# Patient Record
Sex: MALE | Race: OTHER | NOT HISPANIC OR LATINO | URBAN - METROPOLITAN AREA
[De-identification: names, ages, dates, MRNs, and addresses within clinical notes are randomized per-mention and may not be internally consistent; named-entity substitution may affect disease eponyms.]

---

## 2023-10-30 ENCOUNTER — HOSPITAL ENCOUNTER (OUTPATIENT)
Facility: HOSPITAL | Age: 38
Discharge: HOME | End: 2023-11-01
Attending: EMERGENCY MEDICINE | Admitting: EMERGENCY MEDICINE
Payer: MEDICAID

## 2023-10-30 DIAGNOSIS — R56.9 SEIZURE-LIKE ACTIVITY: ICD-10-CM

## 2023-10-30 DIAGNOSIS — R56.9 SEIZURE: Primary | ICD-10-CM

## 2023-10-30 DIAGNOSIS — R07.9 CHEST PAIN: ICD-10-CM

## 2023-10-30 PROBLEM — F11.10 OPIOID ABUSE: Status: ACTIVE | Noted: 2023-10-30

## 2023-10-30 PROBLEM — F11.20 SEVERE OPIOID USE DISORDER: Status: ACTIVE | Noted: 2023-10-30

## 2023-10-30 LAB
ALBUMIN SERPL BCP-MCNC: 4.6 G/DL (ref 3.5–5.2)
ALP SERPL-CCNC: 39 U/L (ref 55–135)
ALT SERPL W/O P-5'-P-CCNC: 27 U/L (ref 10–44)
AMPHET+METHAMPHET UR QL: NEGATIVE
ANION GAP SERPL CALC-SCNC: 8 MMOL/L (ref 8–16)
APAP SERPL-MCNC: 8.3 UG/ML (ref 10–20)
AST SERPL-CCNC: 29 U/L (ref 10–40)
BACTERIA #/AREA URNS AUTO: ABNORMAL /HPF
BARBITURATES UR QL SCN>200 NG/ML: NEGATIVE
BASOPHILS # BLD AUTO: 0.03 K/UL (ref 0–0.2)
BASOPHILS NFR BLD: 0.3 % (ref 0–1.9)
BENZODIAZ UR QL SCN>200 NG/ML: ABNORMAL
BILIRUB SERPL-MCNC: 0.6 MG/DL (ref 0.1–1)
BILIRUB UR QL STRIP: NEGATIVE
BUN SERPL-MCNC: 11 MG/DL (ref 6–20)
BZE UR QL SCN: ABNORMAL
CALCIUM SERPL-MCNC: 9.8 MG/DL (ref 8.7–10.5)
CANNABINOIDS UR QL SCN: ABNORMAL
CHLORIDE SERPL-SCNC: 107 MMOL/L (ref 95–110)
CHOLEST SERPL-MCNC: 205 MG/DL (ref 120–199)
CHOLEST/HDLC SERPL: 4.7 {RATIO} (ref 2–5)
CK SERPL-CCNC: 822 U/L (ref 20–200)
CLARITY UR REFRACT.AUTO: CLEAR
CO2 SERPL-SCNC: 25 MMOL/L (ref 23–29)
COLOR UR AUTO: YELLOW
CREAT SERPL-MCNC: 1.1 MG/DL (ref 0.5–1.4)
CREAT UR-MCNC: >450 MG/DL (ref 23–375)
CREAT UR-MCNC: >450 MG/DL (ref 23–375)
DIFFERENTIAL METHOD: ABNORMAL
EOSINOPHIL # BLD AUTO: 0 K/UL (ref 0–0.5)
EOSINOPHIL NFR BLD: 0 % (ref 0–8)
ERYTHROCYTE [DISTWIDTH] IN BLOOD BY AUTOMATED COUNT: 12.8 % (ref 11.5–14.5)
EST. GFR  (NO RACE VARIABLE): >60 ML/MIN/1.73 M^2
ETHANOL SERPL-MCNC: <10 MG/DL
FENTANYL UR QL SCN: ABNORMAL
GLUCOSE SERPL-MCNC: 115 MG/DL (ref 70–110)
GLUCOSE UR QL STRIP: NEGATIVE
HCT VFR BLD AUTO: 41.8 % (ref 40–54)
HCV AB SERPL QL IA: NORMAL
HDLC SERPL-MCNC: 44 MG/DL (ref 40–75)
HDLC SERPL: 21.5 % (ref 20–50)
HGB BLD-MCNC: 15.4 G/DL (ref 14–18)
HGB UR QL STRIP: NEGATIVE
HIV 1+2 AB+HIV1 P24 AG SERPL QL IA: NORMAL
HYALINE CASTS UR QL AUTO: 0 /LPF
IMM GRANULOCYTES # BLD AUTO: 0.02 K/UL (ref 0–0.04)
IMM GRANULOCYTES NFR BLD AUTO: 0.2 % (ref 0–0.5)
INFLUENZA A, MOLECULAR: NEGATIVE
INFLUENZA B, MOLECULAR: NEGATIVE
KETONES UR QL STRIP: ABNORMAL
LDLC SERPL CALC-MCNC: 148.6 MG/DL (ref 63–159)
LEUKOCYTE ESTERASE UR QL STRIP: NEGATIVE
LYMPHOCYTES # BLD AUTO: 1.3 K/UL (ref 1–4.8)
LYMPHOCYTES NFR BLD: 11.7 % (ref 18–48)
MCH RBC QN AUTO: 28.2 PG (ref 27–31)
MCHC RBC AUTO-ENTMCNC: 36.8 G/DL (ref 32–36)
MCV RBC AUTO: 76 FL (ref 82–98)
METHADONE UR QL SCN>300 NG/ML: NEGATIVE
MICROSCOPIC COMMENT: ABNORMAL
MONOCYTES # BLD AUTO: 0.6 K/UL (ref 0.3–1)
MONOCYTES NFR BLD: 5.9 % (ref 4–15)
NEUTROPHILS # BLD AUTO: 8.8 K/UL (ref 1.8–7.7)
NEUTROPHILS NFR BLD: 81.9 % (ref 38–73)
NITRITE UR QL STRIP: NEGATIVE
NONHDLC SERPL-MCNC: 161 MG/DL
NRBC BLD-RTO: 0 /100 WBC
OPIATES UR QL SCN: NEGATIVE
PCP UR QL SCN>25 NG/ML: NEGATIVE
PH UR STRIP: 5 [PH] (ref 5–8)
PLATELET # BLD AUTO: 299 K/UL (ref 150–450)
PMV BLD AUTO: 11.7 FL (ref 9.2–12.9)
POTASSIUM SERPL-SCNC: 3.9 MMOL/L (ref 3.5–5.1)
PROT SERPL-MCNC: 8.3 G/DL (ref 6–8.4)
PROT UR QL STRIP: ABNORMAL
RBC # BLD AUTO: 5.47 M/UL (ref 4.6–6.2)
RBC #/AREA URNS AUTO: 10 /HPF (ref 0–4)
SARS-COV-2 RDRP RESP QL NAA+PROBE: NEGATIVE
SODIUM SERPL-SCNC: 140 MMOL/L (ref 136–145)
SP GR UR STRIP: >=1.03 (ref 1–1.03)
SPECIMEN SOURCE: NORMAL
SQUAMOUS #/AREA URNS AUTO: 0 /HPF
T4 FREE SERPL-MCNC: 0.9 NG/DL (ref 0.71–1.51)
TOXICOLOGY INFORMATION: ABNORMAL
TRIGL SERPL-MCNC: 62 MG/DL (ref 30–150)
TSH SERPL DL<=0.005 MIU/L-ACNC: 0.15 UIU/ML (ref 0.4–4)
URN SPEC COLLECT METH UR: ABNORMAL
WBC # BLD AUTO: 10.79 K/UL (ref 3.9–12.7)
WBC #/AREA URNS AUTO: 1 /HPF (ref 0–5)

## 2023-10-30 PROCEDURE — G0378 HOSPITAL OBSERVATION PER HR: HCPCS

## 2023-10-30 PROCEDURE — 25000003 PHARM REV CODE 250: Performed by: EMERGENCY MEDICINE

## 2023-10-30 PROCEDURE — 96375 TX/PRO/DX INJ NEW DRUG ADDON: CPT

## 2023-10-30 PROCEDURE — 93010 ELECTROCARDIOGRAM REPORT: CPT | Mod: ,,, | Performed by: INTERNAL MEDICINE

## 2023-10-30 PROCEDURE — 84443 ASSAY THYROID STIM HORMONE: CPT | Performed by: EMERGENCY MEDICINE

## 2023-10-30 PROCEDURE — 99285 EMERGENCY DEPT VISIT HI MDM: CPT | Mod: 25

## 2023-10-30 PROCEDURE — 86803 HEPATITIS C AB TEST: CPT | Performed by: PHYSICIAN ASSISTANT

## 2023-10-30 PROCEDURE — 85025 COMPLETE CBC W/AUTO DIFF WBC: CPT | Performed by: EMERGENCY MEDICINE

## 2023-10-30 PROCEDURE — 96374 THER/PROPH/DIAG INJ IV PUSH: CPT

## 2023-10-30 PROCEDURE — U0002 COVID-19 LAB TEST NON-CDC: HCPCS | Performed by: EMERGENCY MEDICINE

## 2023-10-30 PROCEDURE — 83036 HEMOGLOBIN GLYCOSYLATED A1C: CPT | Performed by: PHYSICIAN ASSISTANT

## 2023-10-30 PROCEDURE — 63600175 PHARM REV CODE 636 W HCPCS: Performed by: EMERGENCY MEDICINE

## 2023-10-30 PROCEDURE — 80061 LIPID PANEL: CPT | Performed by: PHYSICIAN ASSISTANT

## 2023-10-30 PROCEDURE — 93005 ELECTROCARDIOGRAM TRACING: CPT

## 2023-10-30 PROCEDURE — 80143 DRUG ASSAY ACETAMINOPHEN: CPT | Performed by: EMERGENCY MEDICINE

## 2023-10-30 PROCEDURE — 25000003 PHARM REV CODE 250: Performed by: NURSE PRACTITIONER

## 2023-10-30 PROCEDURE — 80354 DRUG SCREENING FENTANYL: CPT | Performed by: NURSE PRACTITIONER

## 2023-10-30 PROCEDURE — 80307 DRUG TEST PRSMV CHEM ANLYZR: CPT | Performed by: NURSE PRACTITIONER

## 2023-10-30 PROCEDURE — 93010 EKG 12-LEAD: ICD-10-PCS | Mod: ,,, | Performed by: INTERNAL MEDICINE

## 2023-10-30 PROCEDURE — 87389 HIV-1 AG W/HIV-1&-2 AB AG IA: CPT | Performed by: PHYSICIAN ASSISTANT

## 2023-10-30 PROCEDURE — 82077 ASSAY SPEC XCP UR&BREATH IA: CPT | Performed by: EMERGENCY MEDICINE

## 2023-10-30 PROCEDURE — 82550 ASSAY OF CK (CPK): CPT | Performed by: EMERGENCY MEDICINE

## 2023-10-30 PROCEDURE — 87502 INFLUENZA DNA AMP PROBE: CPT | Performed by: EMERGENCY MEDICINE

## 2023-10-30 PROCEDURE — 96361 HYDRATE IV INFUSION ADD-ON: CPT

## 2023-10-30 PROCEDURE — 81001 URINALYSIS AUTO W/SCOPE: CPT | Mod: XB | Performed by: NURSE PRACTITIONER

## 2023-10-30 PROCEDURE — 84439 ASSAY OF FREE THYROXINE: CPT | Performed by: EMERGENCY MEDICINE

## 2023-10-30 PROCEDURE — 80053 COMPREHEN METABOLIC PANEL: CPT | Performed by: EMERGENCY MEDICINE

## 2023-10-30 PROCEDURE — 63600175 PHARM REV CODE 636 W HCPCS: Performed by: NURSE PRACTITIONER

## 2023-10-30 RX ORDER — ACETAMINOPHEN 325 MG/1
650 TABLET ORAL EVERY 4 HOURS PRN
Status: DISCONTINUED | OUTPATIENT
Start: 2023-10-30 | End: 2023-11-01 | Stop reason: HOSPADM

## 2023-10-30 RX ORDER — NALOXONE HCL 0.4 MG/ML
0.02 VIAL (ML) INJECTION
Status: DISCONTINUED | OUTPATIENT
Start: 2023-10-30 | End: 2023-11-01 | Stop reason: HOSPADM

## 2023-10-30 RX ORDER — CLONIDINE HYDROCHLORIDE 0.1 MG/1
0.1 TABLET ORAL EVERY 8 HOURS PRN
Status: DISCONTINUED | OUTPATIENT
Start: 2023-10-30 | End: 2023-11-01 | Stop reason: HOSPADM

## 2023-10-30 RX ORDER — IBUPROFEN 200 MG
16 TABLET ORAL
Status: DISCONTINUED | OUTPATIENT
Start: 2023-10-30 | End: 2023-11-01 | Stop reason: HOSPADM

## 2023-10-30 RX ORDER — TIZANIDINE 4 MG/1
4 TABLET ORAL EVERY 8 HOURS PRN
Status: DISCONTINUED | OUTPATIENT
Start: 2023-10-30 | End: 2023-11-01 | Stop reason: HOSPADM

## 2023-10-30 RX ORDER — LORAZEPAM 2 MG/ML
2 INJECTION INTRAMUSCULAR
Status: DISCONTINUED | OUTPATIENT
Start: 2023-10-30 | End: 2023-10-30

## 2023-10-30 RX ORDER — TRAZODONE HYDROCHLORIDE 100 MG/1
100 TABLET ORAL NIGHTLY PRN
Status: DISCONTINUED | OUTPATIENT
Start: 2023-10-30 | End: 2023-11-01 | Stop reason: HOSPADM

## 2023-10-30 RX ORDER — METHOCARBAMOL 500 MG/1
500 TABLET, FILM COATED ORAL 4 TIMES DAILY PRN
Status: DISCONTINUED | OUTPATIENT
Start: 2023-10-30 | End: 2023-10-30

## 2023-10-30 RX ORDER — SODIUM CHLORIDE 0.9 % (FLUSH) 0.9 %
10 SYRINGE (ML) INJECTION EVERY 12 HOURS PRN
Status: DISCONTINUED | OUTPATIENT
Start: 2023-10-30 | End: 2023-11-01 | Stop reason: HOSPADM

## 2023-10-30 RX ORDER — ONDANSETRON 2 MG/ML
4 INJECTION INTRAMUSCULAR; INTRAVENOUS EVERY 8 HOURS PRN
Status: DISCONTINUED | OUTPATIENT
Start: 2023-10-30 | End: 2023-11-01 | Stop reason: HOSPADM

## 2023-10-30 RX ORDER — DICYCLOMINE HYDROCHLORIDE 10 MG/1
10 CAPSULE ORAL 4 TIMES DAILY PRN
Status: DISCONTINUED | OUTPATIENT
Start: 2023-10-30 | End: 2023-11-01 | Stop reason: HOSPADM

## 2023-10-30 RX ORDER — SODIUM CHLORIDE 0.9 % (FLUSH) 0.9 %
10 SYRINGE (ML) INJECTION
Status: DISCONTINUED | OUTPATIENT
Start: 2023-10-30 | End: 2023-11-01 | Stop reason: HOSPADM

## 2023-10-30 RX ORDER — CLONIDINE HYDROCHLORIDE 0.1 MG/1
0.1 TABLET ORAL EVERY 8 HOURS PRN
Status: DISCONTINUED | OUTPATIENT
Start: 2023-10-30 | End: 2023-10-30

## 2023-10-30 RX ORDER — ONDANSETRON 2 MG/ML
4 INJECTION INTRAMUSCULAR; INTRAVENOUS
Status: COMPLETED | OUTPATIENT
Start: 2023-10-30 | End: 2023-10-30

## 2023-10-30 RX ORDER — SODIUM CHLORIDE 9 MG/ML
INJECTION, SOLUTION INTRAVENOUS CONTINUOUS
Status: DISCONTINUED | OUTPATIENT
Start: 2023-10-30 | End: 2023-11-01 | Stop reason: HOSPADM

## 2023-10-30 RX ORDER — TALC
6 POWDER (GRAM) TOPICAL NIGHTLY PRN
Status: DISCONTINUED | OUTPATIENT
Start: 2023-10-30 | End: 2023-11-01 | Stop reason: HOSPADM

## 2023-10-30 RX ORDER — LOPERAMIDE HYDROCHLORIDE 2 MG/1
2 CAPSULE ORAL 4 TIMES DAILY PRN
Status: DISCONTINUED | OUTPATIENT
Start: 2023-10-30 | End: 2023-11-01 | Stop reason: HOSPADM

## 2023-10-30 RX ORDER — LEVETIRACETAM 500 MG/5ML
2000 INJECTION, SOLUTION, CONCENTRATE INTRAVENOUS
Status: COMPLETED | OUTPATIENT
Start: 2023-10-30 | End: 2023-10-30

## 2023-10-30 RX ORDER — ACETAMINOPHEN 500 MG
1000 TABLET ORAL
Status: COMPLETED | OUTPATIENT
Start: 2023-10-30 | End: 2023-10-30

## 2023-10-30 RX ORDER — IBUPROFEN 200 MG
24 TABLET ORAL
Status: DISCONTINUED | OUTPATIENT
Start: 2023-10-30 | End: 2023-11-01 | Stop reason: HOSPADM

## 2023-10-30 RX ORDER — ACETAMINOPHEN 500 MG
1000 TABLET ORAL EVERY 8 HOURS PRN
Status: DISCONTINUED | OUTPATIENT
Start: 2023-10-30 | End: 2023-11-01 | Stop reason: HOSPADM

## 2023-10-30 RX ORDER — KETOROLAC TROMETHAMINE 30 MG/ML
10 INJECTION, SOLUTION INTRAMUSCULAR; INTRAVENOUS
Status: COMPLETED | OUTPATIENT
Start: 2023-10-30 | End: 2023-10-30

## 2023-10-30 RX ORDER — BUPRENORPHINE AND NALOXONE 8; 2 MG/1; MG/1
1 FILM, SOLUBLE BUCCAL; SUBLINGUAL ONCE
Status: COMPLETED | OUTPATIENT
Start: 2023-10-30 | End: 2023-10-30

## 2023-10-30 RX ORDER — GLUCAGON 1 MG
1 KIT INJECTION
Status: DISCONTINUED | OUTPATIENT
Start: 2023-10-30 | End: 2023-11-01 | Stop reason: HOSPADM

## 2023-10-30 RX ORDER — PROCHLORPERAZINE EDISYLATE 5 MG/ML
5 INJECTION INTRAMUSCULAR; INTRAVENOUS ONCE
Status: COMPLETED | OUTPATIENT
Start: 2023-10-30 | End: 2023-10-30

## 2023-10-30 RX ORDER — PROMETHAZINE HYDROCHLORIDE 25 MG/1
25 TABLET ORAL EVERY 6 HOURS PRN
Status: DISCONTINUED | OUTPATIENT
Start: 2023-10-30 | End: 2023-11-01 | Stop reason: HOSPADM

## 2023-10-30 RX ORDER — HYDROXYZINE HYDROCHLORIDE 25 MG/1
50 TABLET, FILM COATED ORAL EVERY 6 HOURS PRN
Status: DISCONTINUED | OUTPATIENT
Start: 2023-10-30 | End: 2023-11-01 | Stop reason: HOSPADM

## 2023-10-30 RX ADMIN — ONDANSETRON 4 MG: 2 INJECTION INTRAMUSCULAR; INTRAVENOUS at 04:10

## 2023-10-30 RX ADMIN — BUPRENORPHINE AND NALOXONE 1 FILM: 8; 2 FILM BUCCAL; SUBLINGUAL at 11:10

## 2023-10-30 RX ADMIN — KETOROLAC TROMETHAMINE 10 MG: 30 INJECTION INTRAMUSCULAR; INTRAVENOUS at 07:10

## 2023-10-30 RX ADMIN — LEVETIRACETAM 2000 MG: 100 INJECTION INTRAVENOUS at 04:10

## 2023-10-30 RX ADMIN — ACETAMINOPHEN 1000 MG: 500 TABLET ORAL at 04:10

## 2023-10-30 RX ADMIN — ACETAMINOPHEN 1000 MG: 500 TABLET ORAL at 11:10

## 2023-10-30 RX ADMIN — SODIUM CHLORIDE 1000 ML: 9 INJECTION, SOLUTION INTRAVENOUS at 10:10

## 2023-10-30 RX ADMIN — PROCHLORPERAZINE EDISYLATE 5 MG: 5 INJECTION INTRAMUSCULAR; INTRAVENOUS at 10:10

## 2023-10-30 RX ADMIN — LORAZEPAM 2 MG: 2 INJECTION INTRAMUSCULAR; INTRAVENOUS at 05:10

## 2023-10-30 NOTE — ED TRIAGE NOTES
The patient was voluntarily admitted to [facility] for a heroin detox. The patient according to the [facility] had three witnessed seizures this morning and per EMS the patient had tow witnessed seizures in route. The patient is also endorsing nausea and vomiting, body aches. The patient deneis SI or HI.

## 2023-10-30 NOTE — FIRST PROVIDER EVALUATION
Medical screening examination initiated.  I have conducted a focused provider triage encounter, findings are as follows:    Brief history of present illness:  37 yo M referred from [facility]. He presented there voluntarily for opioid detox and reported n/v. Unclear where triage CC of seizure came from    Vitals:    10/30/23 1514   BP: (!) 160/112   BP Location: Right arm   Patient Position: Sitting   Pulse: 60   Resp: 14   Temp: 99.7 °F (37.6 °C)   TempSrc: Oral   SpO2: 100%       Pertinent physical exam:  Sleepy but easily roused. No respiratory depression    Brief workup plan:  presentation not c/w opioid withdrawal. Exam c/w opioid intoxication or possible post-ictal state. Will obtain medical clearance labs for JOJO care disposition. Defer imaging to full evaluation in ED bed.    Preliminary workup initiated; this workup will be continued and followed by the physician or advanced practice provider that is assigned to the patient when roomed.

## 2023-10-30 NOTE — PHARMACY MED REC
"Admission Medication History     The home medication history was taken by [facility].    You may go to "Admission" then "Reconcile Home Medications" tabs to review and/or act upon these items.     The home medication list has been updated by the Pharmacy department.   Please read ALL comments highlighted in yellow.   Please address this information as you see fit.    Feel free to contact us if you have any questions or require assistance.      PATIENT REPORTS NOT TAKING ANY MEDICATIONS. NO FILL HISTORY.          Potential issues to be addressed PRIOR TO DISCHARGE  Please discuss with the patient barriers to adherence with medication treatment plans  Patient requires education regarding drug therapies     E[facility] V[facility]  EXT [id number]                  .          "

## 2023-10-30 NOTE — ED NOTES
Patient had one witnessed seizure, seizures lasted aproximitly one minute, MD made aware, MD at bedside. Patient is postictal and confused.

## 2023-10-31 LAB
ALBUMIN SERPL BCP-MCNC: 4.2 G/DL (ref 3.5–5.2)
ALP SERPL-CCNC: 34 U/L (ref 55–135)
ALT SERPL W/O P-5'-P-CCNC: 24 U/L (ref 10–44)
ANION GAP SERPL CALC-SCNC: 16 MMOL/L (ref 8–16)
AST SERPL-CCNC: 32 U/L (ref 10–40)
BASOPHILS # BLD AUTO: 0.04 K/UL (ref 0–0.2)
BASOPHILS NFR BLD: 0.3 % (ref 0–1.9)
BILIRUB SERPL-MCNC: 0.7 MG/DL (ref 0.1–1)
BUN SERPL-MCNC: 11 MG/DL (ref 6–20)
CALCIUM SERPL-MCNC: 9.1 MG/DL (ref 8.7–10.5)
CHLORIDE SERPL-SCNC: 108 MMOL/L (ref 95–110)
CO2 SERPL-SCNC: 17 MMOL/L (ref 23–29)
CREAT SERPL-MCNC: 1.1 MG/DL (ref 0.5–1.4)
DIFFERENTIAL METHOD: ABNORMAL
EOSINOPHIL # BLD AUTO: 0 K/UL (ref 0–0.5)
EOSINOPHIL NFR BLD: 0.1 % (ref 0–8)
ERYTHROCYTE [DISTWIDTH] IN BLOOD BY AUTOMATED COUNT: 12.8 % (ref 11.5–14.5)
EST. GFR  (NO RACE VARIABLE): >60 ML/MIN/1.73 M^2
ESTIMATED AVG GLUCOSE: 100 MG/DL (ref 68–131)
GLUCOSE SERPL-MCNC: 80 MG/DL (ref 70–110)
HBA1C MFR BLD: 5.1 % (ref 4–5.6)
HCT VFR BLD AUTO: 40.6 % (ref 40–54)
HGB BLD-MCNC: 15.2 G/DL (ref 14–18)
IMM GRANULOCYTES # BLD AUTO: 0.04 K/UL (ref 0–0.04)
IMM GRANULOCYTES NFR BLD AUTO: 0.3 % (ref 0–0.5)
LYMPHOCYTES # BLD AUTO: 2.9 K/UL (ref 1–4.8)
LYMPHOCYTES NFR BLD: 20.1 % (ref 18–48)
MAGNESIUM SERPL-MCNC: 2.1 MG/DL (ref 1.6–2.6)
MCH RBC QN AUTO: 28.1 PG (ref 27–31)
MCHC RBC AUTO-ENTMCNC: 37.4 G/DL (ref 32–36)
MCV RBC AUTO: 75 FL (ref 82–98)
MONOCYTES # BLD AUTO: 1.7 K/UL (ref 0.3–1)
MONOCYTES NFR BLD: 11.7 % (ref 4–15)
NEUTROPHILS # BLD AUTO: 9.6 K/UL (ref 1.8–7.7)
NEUTROPHILS NFR BLD: 67.5 % (ref 38–73)
NRBC BLD-RTO: 0 /100 WBC
PLATELET # BLD AUTO: 266 K/UL (ref 150–450)
PMV BLD AUTO: 11.5 FL (ref 9.2–12.9)
POTASSIUM SERPL-SCNC: 4 MMOL/L (ref 3.5–5.1)
PROT SERPL-MCNC: 7.5 G/DL (ref 6–8.4)
RBC # BLD AUTO: 5.4 M/UL (ref 4.6–6.2)
SODIUM SERPL-SCNC: 141 MMOL/L (ref 136–145)
WBC # BLD AUTO: 14.15 K/UL (ref 3.9–12.7)

## 2023-10-31 PROCEDURE — 95816 EEG AWAKE AND DROWSY: CPT | Mod: 26,,, | Performed by: PSYCHIATRY & NEUROLOGY

## 2023-10-31 PROCEDURE — 99214 OFFICE O/P EST MOD 30 MIN: CPT | Mod: ,,, | Performed by: PSYCHIATRY & NEUROLOGY

## 2023-10-31 PROCEDURE — 96376 TX/PRO/DX INJ SAME DRUG ADON: CPT | Mod: 59

## 2023-10-31 PROCEDURE — 95813 EEG EXTND MNTR 61-119 MIN: CPT

## 2023-10-31 PROCEDURE — 99232 PR SUBSEQUENT HOSPITAL CARE,LEVL II: ICD-10-PCS | Mod: ,,, | Performed by: INTERNAL MEDICINE

## 2023-10-31 PROCEDURE — 96374 THER/PROPH/DIAG INJ IV PUSH: CPT | Mod: 59

## 2023-10-31 PROCEDURE — A9585 GADOBUTROL INJECTION: HCPCS | Performed by: INTERNAL MEDICINE

## 2023-10-31 PROCEDURE — 80053 COMPREHEN METABOLIC PANEL: CPT | Performed by: NURSE PRACTITIONER

## 2023-10-31 PROCEDURE — 96361 HYDRATE IV INFUSION ADD-ON: CPT

## 2023-10-31 PROCEDURE — 99232 SBSQ HOSP IP/OBS MODERATE 35: CPT | Mod: ,,, | Performed by: INTERNAL MEDICINE

## 2023-10-31 PROCEDURE — G0378 HOSPITAL OBSERVATION PER HR: HCPCS

## 2023-10-31 PROCEDURE — 85025 COMPLETE CBC W/AUTO DIFF WBC: CPT | Performed by: NURSE PRACTITIONER

## 2023-10-31 PROCEDURE — 25500020 PHARM REV CODE 255: Performed by: INTERNAL MEDICINE

## 2023-10-31 PROCEDURE — 95816 PR EEG,W/AWAKE & DROWSY RECORD: ICD-10-PCS | Mod: 26,,, | Performed by: PSYCHIATRY & NEUROLOGY

## 2023-10-31 PROCEDURE — 99214 PR OFFICE/OUTPT VISIT, EST, LEVL IV, 30-39 MIN: ICD-10-PCS | Mod: ,,, | Performed by: PSYCHIATRY & NEUROLOGY

## 2023-10-31 PROCEDURE — 99222 PR INITIAL HOSPITAL CARE,LEVL II: ICD-10-PCS | Mod: AF,HB,, | Performed by: STUDENT IN AN ORGANIZED HEALTH CARE EDUCATION/TRAINING PROGRAM

## 2023-10-31 PROCEDURE — 63600175 PHARM REV CODE 636 W HCPCS: Performed by: INTERNAL MEDICINE

## 2023-10-31 PROCEDURE — 36415 COLL VENOUS BLD VENIPUNCTURE: CPT | Performed by: NURSE PRACTITIONER

## 2023-10-31 PROCEDURE — 83735 ASSAY OF MAGNESIUM: CPT | Performed by: NURSE PRACTITIONER

## 2023-10-31 PROCEDURE — 99222 1ST HOSP IP/OBS MODERATE 55: CPT | Mod: AF,HB,, | Performed by: STUDENT IN AN ORGANIZED HEALTH CARE EDUCATION/TRAINING PROGRAM

## 2023-10-31 PROCEDURE — 25000003 PHARM REV CODE 250: Performed by: NURSE PRACTITIONER

## 2023-10-31 RX ORDER — GADOBUTROL 604.72 MG/ML
9 INJECTION INTRAVENOUS
Status: COMPLETED | OUTPATIENT
Start: 2023-10-31 | End: 2023-10-31

## 2023-10-31 RX ORDER — BUPRENORPHINE AND NALOXONE 8; 2 MG/1; MG/1
1 FILM, SOLUBLE BUCCAL; SUBLINGUAL 2 TIMES DAILY
Status: DISCONTINUED | OUTPATIENT
Start: 2023-10-31 | End: 2023-11-01 | Stop reason: HOSPADM

## 2023-10-31 RX ORDER — LORAZEPAM 2 MG/ML
1 INJECTION INTRAMUSCULAR EVERY 4 HOURS PRN
Status: DISCONTINUED | OUTPATIENT
Start: 2023-10-31 | End: 2023-11-01 | Stop reason: HOSPADM

## 2023-10-31 RX ADMIN — SODIUM CHLORIDE: 9 INJECTION, SOLUTION INTRAVENOUS at 01:10

## 2023-10-31 RX ADMIN — HYDROXYZINE HYDROCHLORIDE 50 MG: 25 TABLET, FILM COATED ORAL at 07:10

## 2023-10-31 RX ADMIN — GADOBUTROL 9 ML: 604.72 INJECTION INTRAVENOUS at 08:10

## 2023-10-31 RX ADMIN — HYDROXYZINE HYDROCHLORIDE 50 MG: 25 TABLET, FILM COATED ORAL at 08:10

## 2023-10-31 RX ADMIN — TRAZODONE HYDROCHLORIDE 100 MG: 100 TABLET ORAL at 08:10

## 2023-10-31 RX ADMIN — ACETAMINOPHEN 1000 MG: 500 TABLET ORAL at 03:10

## 2023-10-31 RX ADMIN — BUPRENORPHINE AND NALOXONE 1 FILM: 8; 2 FILM BUCCAL; SUBLINGUAL at 08:10

## 2023-10-31 RX ADMIN — LORAZEPAM 1 MG: 2 INJECTION INTRAMUSCULAR; INTRAVENOUS at 12:10

## 2023-10-31 RX ADMIN — BUPRENORPHINE AND NALOXONE 1 FILM: 8; 2 FILM BUCCAL; SUBLINGUAL at 02:10

## 2023-10-31 RX ADMIN — CLONIDINE HYDROCHLORIDE 0.1 MG: 0.1 TABLET ORAL at 08:10

## 2023-10-31 NOTE — HPI
"The patient is a 37 yo man. Neurology consulted for possible seizure-like activity. He has a history of heroin addiction/opioid use disorder. He presented to [facility] for possible seizure-like activity. Per primary team "Patient denies any history of seizures. He was unable to recall what happened early today. I called [facility] and spoke with the house supervisor who stated the patient was okay this morning when the provider rounded. He took a PRN dose of suboxone around 9:30am. She states later in the day she witnessed these episodes of seizure-like activity and did not think they were consistent with seizures as patient was moving different parts of his body at different times and was tracking with his eyes. She reports he had no postictal period, tongue biting, or urinary incontinence." At arrival, . CTH allowing for some degree of motion artifact, no convincing acute intracranial abnormalities. The patient received keppra 2g, and ativan 2mg. An additional seizure like episode was witnessed by the ED provider. Neurology consulted   "

## 2023-10-31 NOTE — H&P
[facility] - Emergency Dept  [facility] Medicine  History & Physical    Patient Name: Lou Cunha  MRN: [id number]  Patient Class: OP- Observation  Admission Date: 10/30/2023  Attending Physician: Elgin Chen MD   Primary Care Provider: No primary care provider on file.         Patient information was obtained from patient, past medical records, and ER records.     Subjective:     Principal Problem:Seizure-like activity    Chief Complaint:   Chief Complaint   Patient presents with    Seizures     From [place], voluntary for heroin detox, reports nv, not PEC/CEC, no HI/SI        HPI: Lou Cunha is a 38 y.o. male with a PMHx of heroin addiction/opioid use disorder who presents to the ED from [place] for seizure like activity. Patient denies any history of seizures. He was unable to recall what happened early today. I called [facility] and spoke with the house supervisor who stated the patient was okay this morning when the provider rounded. He took a PRN dose of suboxone around 9:30am. She states later in the day she witnessed these episodes of seizure-like activity and did not think they were consistent with seizures as patient was moving different parts of his body at different times and was tracking with his eyes. She reports he had no postictal period, tongue biting, or urinary incontinence. The patient endorses chills, diaphoresis, myalgias, N/V/D, headache, or SOB. The patient reports he last used heroin the day before being admitted to [facility]. Denies any other illicit drug use or ETOH use.     In the ED, patient hypertensive otherwise vitals stable, afebrile. CBC unremarkable. Cr 1.1. Glucose 115. Acetaminophen 8.3. ETOH <10. TSH 0.147 but  free T4 0.90. . Hepatitis C Ab and HIV 1/2 Ag/Ab Non-reactive. Flu/COVID negative. UA and UDS pending. EKG with shows SR, 57 bpm, no acute ischemic changes. CTH allowing for some degree of motion artifact, no convincing acute intracranial  "abnormalities. CXR with no acute abnormality. The patient received toradol 10mg, tylenol 1g, zofran 4mg, keppra 2g, and ativan 2mg. An additional seizure like episode was witnessed by the ED provider, "Called to bedside the patient had another witnessed seizure lasting less than 1 minute. Patient had no incontinence, no tongue biting he had no change in his vital signs throughout the episode. On my arrival were able to immediately wake the patient up and he is not postictal in any way.  Unclear if this was a true seizure versus a nonepileptic psychogenic seizure."    No past medical history on file.    No past surgical history on file.    Review of patient's allergies indicates:  Not on File    No current facility-administered medications on file prior to encounter.     No current outpatient medications on file prior to encounter.     Family History    None       Tobacco Use    Smoking status: Not on file    Smokeless tobacco: Not on file   Substance and Sexual Activity    Alcohol use: Not on file    Drug use: Not on file    Sexual activity: Not on file     Review of Systems   Constitutional:  Positive for chills and diaphoresis. Negative for appetite change, fatigue and fever.   HENT:  Negative for congestion, rhinorrhea and sore throat.    Eyes:  Negative for photophobia and visual disturbance.   Respiratory:  Positive for shortness of breath. Negative for cough and wheezing.    Cardiovascular:  Negative for chest pain, palpitations and leg swelling.   Gastrointestinal:  Positive for diarrhea, nausea and vomiting. Negative for abdominal distention and abdominal pain.   Genitourinary:  Negative for dysuria, frequency and hematuria.   Musculoskeletal:  Positive for back pain and myalgias. Negative for joint swelling.   Skin:  Negative for color change, pallor, rash and wound.   Neurological:  Positive for seizures and headaches.   Psychiatric/Behavioral:  Negative for confusion and hallucinations. The patient is " nervous/anxious.      Objective:     Vital Signs (Most Recent):  Temp: 99.7 °F (37.6 °C) (10/30/23 1514)  Pulse: 64 (10/30/23 1700)  Resp: 17 (10/30/23 1700)  BP: (!) 177/109 (10/30/23 1700)  SpO2: 98 % (10/30/23 1700) Vital Signs (24h Range):  Temp:  [99.7 °F (37.6 °C)] 99.7 °F (37.6 °C)  Pulse:  [60-64] 64  Resp:  [14-17] 17  SpO2:  [98 %-100 %] 98 %  BP: (160-177)/(109-112) 177/109        There is no height or weight on file to calculate BMI.     Physical Exam  Vitals and nursing note reviewed.   Constitutional:       General: He is sleeping. He is not in acute distress.     Appearance: He is diaphoretic. He is not toxic-appearing.   HENT:      Head: Normocephalic and atraumatic.      Nose: Nose normal.      Mouth/Throat:      Mouth: Mucous membranes are dry.   Eyes:      Pupils: Pupils are equal, round, and reactive to light.   Cardiovascular:      Rate and Rhythm: Normal rate and regular rhythm.      Pulses: Normal pulses.   Pulmonary:      Effort: Pulmonary effort is normal. No respiratory distress.      Breath sounds: No wheezing, rhonchi or rales.   Abdominal:      General: Bowel sounds are normal. There is no distension.      Palpations: Abdomen is soft.      Tenderness: There is no abdominal tenderness. There is no guarding.   Musculoskeletal:         General: Normal range of motion.      Cervical back: Normal range of motion.   Skin:     General: Skin is warm.      Capillary Refill: Capillary refill takes less than 2 seconds.   Neurological:      General: No focal deficit present.      Mental Status: He is oriented to person, place, and time and easily aroused.      Motor: No weakness, tremor or seizure activity.   Psychiatric:         Attention and Perception: He is inattentive.         Behavior: Behavior is agitated and withdrawn.              CRANIAL NERVES     CN III, IV, VI   Pupils are equal, round, and reactive to light.       Significant Labs: All pertinent labs within the past 24 hours have been  reviewed.  CBC:   Recent Labs   Lab 10/30/23  1823   WBC 10.79   HGB 15.4   HCT 41.8        CMP:   Recent Labs   Lab 10/30/23  1823      K 3.9      CO2 25   *   BUN 11   CREATININE 1.1   CALCIUM 9.8   PROT 8.3   ALBUMIN 4.6   BILITOT 0.6   ALKPHOS 39*   AST 29   ALT 27   ANIONGAP 8     TSH:   Recent Labs   Lab 10/30/23  1823   TSH 0.147*       Significant Imaging: I have reviewed all pertinent imaging results/findings within the past 24 hours.    Imaging Results              CT Head Without Contrast (Final result)  Result time 10/30/23 18:45:05      Final result by Urban Dent MD (10/30/23 18:45:05)                   Impression:      1. Allowing for some degree of motion artifact, no convincing acute intracranial abnormalities.      Electronically signed by: Urban Dent MD  Date:    10/30/2023  Time:    18:45               Narrative:    EXAMINATION:  CT HEAD WITHOUT CONTRAST    CLINICAL HISTORY:  Seizure, new-onset, no history of trauma;    TECHNIQUE:  Low dose axial images were obtained through the head.  Coronal and sagittal reformations were also performed. Contrast was not administered.    COMPARISON:  None.    FINDINGS:  There is motion artifact.    There is no evidence of acute major vascular territory infarct, hemorrhage, or mass.  There is no hydrocephalus.  There are no abnormal extra-axial fluid collections.  The paranasal sinuses and mastoid air cells are clear, and there is no evidence of calvarial fracture.  The visualized soft tissues are unremarkable.                                       X-Ray Chest AP Portable (Final result)  Result time 10/30/23 16:58:06      Final result by Sebastian Armstrong MD (10/30/23 16:58:06)                   Impression:      No acute abnormality.      Electronically signed by: Sebastian Armstrong MD  Date:    10/30/2023  Time:    16:58               Narrative:    EXAMINATION:  XR CHEST AP PORTABLE    CLINICAL  HISTORY:  seizure;    TECHNIQUE:  Single frontal view of the chest was performed.    COMPARISON:  None    FINDINGS:  Lungs are clear.  No effusion or pneumothorax.    No acute bone abnormality.                                      Assessment/Plan:     * Seizure-like activity  Patient presents from [place] for seizure-like activity. Patient denies any history of seizures. He was unable to recall what happened early today. I called [facility] and spoke with the house supervisor who stated the patient was okay this morning when the provider rounded. He took a PRN dose of suboxone around 9:30am. She states later in the day she witnessed these episodes of seizure-like activity and did not think they were consistent with seizures as patient was moving different parts of his body at different times and was tracking with his eyes. She reports he had no postictal period, tongue biting, or urinary incontinence.  -Patient received 2g keppra in the ED and 2mg IV ativan.  -Labs largely unremarkable. .  -UDS +cocaine, fentanyl, THC, and benzos.  -CTH negative for acute process.  -EEG pending.  -Monitor neuro status closely.  -Seizure/fall precautions.     Severe opioid use disorder  Patient presents from [place] where he was admitted for voluntary detox on 10/28. Patient reports he last snorted heroin 10/29 but when asked further he states the day before he went to [facility]. Denies any other illicit drug use or ETOH use.    -Patient PECd in the ED, continue for now.  -UDS +cocaine, fentanyl, THC, and benzos.  -ETOH <10  -Psychiatry consulted due to opioid withdrawal. Last dose Suboxone 4-2mg at [facility] at 9:38. Will give a dose of Suboxone tonight.  -PRN trazodone, zanaflex, clonidine, imodium, bentyl, and hydroxyzine.  -PRN antiemetics.       VTE Risk Mitigation (From admission, onward)           Ordered     IP VTE LOW RISK PATIENT  Once         10/30/23 2017     Place sequential compression device  Until  discontinued         10/30/23 2017                         On 10/30/2023, patient should be placed in hospital observation services under my care in collaboration with Elgin Chen MD.          Brynn Thacker NP  Department of Hospital Medicine  [facility] - Emergency Dept    N/A  Family history non-contributory to current problem   Pertinent information:

## 2023-10-31 NOTE — CONSULTS
"CONSULTATION LIAISON PSYCHIATRY INITIAL EVALUATION    Patient Name: Lou Cunha  MRN: [id number]  Patient Class: OP- Observation  Admission Date: 10/30/2023  Attending Physician: Goldy Gallego MD      HPI:   Lou Cunha is a 38 y.o. male with past psychiatric history of opioid use disorder admitted to the hospital for Seizure-like activity after being at [facility] for opioid detox earlier in the day.    Per Internal Medicine:  Lou Cunha is a 38 y.o. male with a PMHx of heroin addiction/opioid use disorder who presents to the ED from [facility] for seizure like activity. Patient denies any history of seizures. He was unable to recall what happened early today. I called [facility] and spoke with the house supervisor who stated the patient was okay this morning when the provider rounded. He took a PRN dose of suboxone around 9:30am. She states later in the day she witnessed these episodes of seizure-like activity and did not think they were consistent with seizures as patient was moving different parts of his body at different times and was tracking with his eyes. She reports he had no postictal period, tongue biting, or urinary incontinence. The patient endorses chills, diaphoresis, myalgias, N/V/D, headache, or SOB. The patient reports he last used heroin the day before being admitted to [facility]. Denies any other illicit drug use or ETOH use.     Psychiatry consulted for "opioid withdrawal"    Below history obtained from resident with aid of medical students:    Patient is alert and oriented. Patient last used heroin 3-4 days ago. Admitted to [facility] 3 days ago on his own accord, wants to get right for his son. Received suboxone while at [facility] per primary history, though patient seemed unsure of what medication were given to him at [facility]. Reports initial withdrawal symptoms of N/V, diarrhea, headaches, yawning, lacrimation, body aches, diaphoresis, though says he feels much better, " and denies any of these symptoms at time of psychiatric interview. Pt reports seizures started yesterday, no prior history of seizures. No other substance use. His mood has been shitty recently. Has not been sleeping well, low energy, anhedonia, decreased concentration. He is anxious most days about the issues in his life. Access to firearms at home, locked up. No SI/HI. No AH/VH.    Of note, when patient seen by resident and students, he appeared drowsy, but otherwise fully oriented, calm and cooperative, with full, reactive and appropriate affect. Thought process was linear, and patient denied any symptoms of psychosis. Less than an hour after this interview, patient seen by attending who noted patient to be irritable, with difficulty participating in evaluation. He also endorsed hallucinations at this time of his aunt who had passed away.      Medical Review of Systems:  Pertinent items are noted in HPI.    Psychiatric Review of Systems (is patient experiencing or having changes in):  sleep: yes, decreased  appetite: yes, decreased  weight: yes, decreased  energy/anergy: yes, decreased  interest/pleasure/anhedonia: yes  anxiety/panic: yes  guilty/hopelessness: no  concentration: yes  Carlene:no  Psychosis: initially no, but then endorses later  Trauma: no  S.I.B.s/risky behavior: no    Past Psychiatric History:  Previous Medication Trials: no  Previous Psychiatric Hospitalizations:no   Previous Suicide Attempts: no  History of Violence: no  Outpatient Psychiatrist: no  Family Psychiatric History: did not assess    Substance Abuse History (with emphasis over the last 12 months):  Recreational Drugs: heroin  Use of Alcohol: denied  Tobacco Use:no  Rehab History:no    Social History:  Marital Status: not   Children: 1  Employment Status/Info: currently employed, construction  :no  Education: 9th grade  Special Ed: did not assess  Housing Status: did not assess  Access to gun: yes, locked  "  Psychosocial Stressors: drug and alcohol  Functioning Relationships: good relationship with spouse or significant other and good relationship with children    Legal History:  Past Charges/Incarcerations: yes  Pending charges:no    Mental Status Exam:  General Appearance: appears stated age, well-developed, well-nourished, in no acute distress, drowsy  Behavior: normal, cooperative, friendly, pleasant  Involuntary Movements and Motor Activity: no abnormal involuntary movements noted; no tics, no tremors, no akathisia, no dystonia, no evidence of tardive dyskinesia; no psychomotor agitation or retardation  Gait and Station: intact, normal gait and station, ambulates without assistance  Speech and Language: normal rhythm, normal volume, normal tone, slowed  Mood: "shitty"  Affect: labile  Thought Process and Associations: intact; linear, goal-directed, organized, and logical; no loosening of associations noted  Thought Content and Perceptions::  No SI/HI, initially denied AVH, but later endorsed VH to attending   Sensorium and Orientation: intact; alert with clear sensorium; oriented fully to person, place, time and situation  Recent and Remote Memory: grossly intact, registers 3/3 objects, recalls 2/3 objects at 5 minutes  Attention and Concentration: grossly intact, able to spell WORLD forwards  Fund of Knowledge: grossly intact  Insight: adequate  Judgment: adequate    CAM ICU positive? no      ASSESSMENT & RECOMMENDATIONS   Schizophrenia Spectrum and Other Psychotic Disorders  R/o Psychotic Disorder due to another Medical Condition  Seizure-like Activity   Opioid Use Disorder, Severe     Scheduled Medications   - Start Suboxone 8 mg BID   - No further psychiatric medications indicated at this time, pending further neuro evaluation    Opiate Withdrawal Protocol:  -Clonidine 0.1mg PO q4hr PRN for withdrawal associated HTN  -Bentyl 10mg PO q6hr PRN for abdominal muscle cramps  -Loperamide 2mg PO q6hr PRN for " diarrhea  -Robaxin 500mg PO q6hr PRN for muscle spasm  -Zofran 4mg ODT q8hr PRN for nausea  -Vistaril 50mg PO qHS PRN for insomnia     RISK ASSESSMENT  NEEDS PEC because patient is in imminent danger of hurting self or others and is gravely disabled. & NEEDS 1:1 sitter    FOLLOW UP  Will follow up while in house    DISPOSITION - once medically cleared:   Defer to medical team    Please contact ON CALL psychiatry service (24/7) for any acute issues that may arise.    Dr. Boni Molina  [facility] PGY2  CL Psychiatry  [facility]  10/31/2023 11:41 AM        --------------------------------------------------------------------------------------------------------------------------------------------------------------------------------------------------------------------------------------    CONTINUED HISTORY & OBJECTIVE clinical data & findings reviewed and noted for above decision making    Past Medical/Surgical History:   History reviewed. No pertinent past medical history.  History reviewed. No pertinent surgical history.    Current Medications:   Scheduled Meds:   PRN Meds: acetaminophen, acetaminophen, cloNIDine, dextrose 10%, dextrose 10%, dicyclomine, glucagon (human recombinant), glucose, glucose, hydrOXYzine HCL, loperamide, melatonin, naloxone, ondansetron, promethazine, sodium chloride 0.9%, sodium chloride 0.9%, tiZANidine, traZODone    Allergies:   Review of patient's allergies indicates:  Not on File    Vitals  Vitals:    10/31/23 1138   BP: (!) 156/90   Pulse: (!) 59   Resp: 16   Temp: 99 °F (37.2 °C)       Labs/Imaging/Studies:  Recent Results (from the past 24 hour(s))   COVID-19 Rapid Screening    Collection Time: 10/30/23  4:36 PM   Result Value Ref Range    SARS-CoV-2 RNA, Amplification, Qual Negative Negative   Influenza A & B by Molecular    Collection Time: 10/30/23  4:36 PM    Specimen: Nasopharyngeal Swab   Result Value Ref Range    Influenza A, Molecular Negative  Negative    Influenza B, Molecular Negative Negative    Flu A & B Source Nasal swab    HIV 1/2 Ag/Ab (4th Gen)    Collection Time: 10/30/23  6:23 PM   Result Value Ref Range    HIV 1/2 Ag/Ab Non-reactive Non-reactive   Hepatitis C Antibody    Collection Time: 10/30/23  6:23 PM   Result Value Ref Range    Hepatitis C Ab Non-reactive Non-reactive   CBC auto differential    Collection Time: 10/30/23  6:23 PM   Result Value Ref Range    WBC 10.79 3.90 - 12.70 K/uL    RBC 5.47 4.60 - 6.20 M/uL    Hemoglobin 15.4 14.0 - 18.0 g/dL    Hematocrit 41.8 40.0 - 54.0 %    MCV 76 (L) 82 - 98 fL    MCH 28.2 27.0 - 31.0 pg    MCHC 36.8 (H) 32.0 - 36.0 g/dL    RDW 12.8 11.5 - 14.5 %    Platelets 299 150 - 450 K/uL    MPV 11.7 9.2 - 12.9 fL    Immature Granulocytes 0.2 0.0 - 0.5 %    Gran # (ANC) 8.8 (H) 1.8 - 7.7 K/uL    Immature Grans (Abs) 0.02 0.00 - 0.04 K/uL    Lymph # 1.3 1.0 - 4.8 K/uL    Mono # 0.6 0.3 - 1.0 K/uL    Eos # 0.0 0.0 - 0.5 K/uL    Baso # 0.03 0.00 - 0.20 K/uL    nRBC 0 0 /100 WBC    Gran % 81.9 (H) 38.0 - 73.0 %    Lymph % 11.7 (L) 18.0 - 48.0 %    Mono % 5.9 4.0 - 15.0 %    Eosinophil % 0.0 0.0 - 8.0 %    Basophil % 0.3 0.0 - 1.9 %    Differential Method Automated    Comprehensive metabolic panel    Collection Time: 10/30/23  6:23 PM   Result Value Ref Range    Sodium 140 136 - 145 mmol/L    Potassium 3.9 3.5 - 5.1 mmol/L    Chloride 107 95 - 110 mmol/L    CO2 25 23 - 29 mmol/L    Glucose 115 (H) 70 - 110 mg/dL    BUN 11 6 - 20 mg/dL    Creatinine 1.1 0.5 - 1.4 mg/dL    Calcium 9.8 8.7 - 10.5 mg/dL    Total Protein 8.3 6.0 - 8.4 g/dL    Albumin 4.6 3.5 - 5.2 g/dL    Total Bilirubin 0.6 0.1 - 1.0 mg/dL    Alkaline Phosphatase 39 (L) 55 - 135 U/L    AST 29 10 - 40 U/L    ALT 27 10 - 44 U/L    eGFR >60.0 >60 mL/min/1.73 m^2    Anion Gap 8 8 - 16 mmol/L   TSH    Collection Time: 10/30/23  6:23 PM   Result Value Ref Range    TSH 0.147 (L) 0.400 - 4.000 uIU/mL   Ethanol    Collection Time: 10/30/23  6:23 PM   Result  Value Ref Range    Alcohol, Serum <10 <10 mg/dL   Acetaminophen level    Collection Time: 10/30/23  6:23 PM   Result Value Ref Range    Acetaminophen (Tylenol), Serum 8.3 (L) 10.0 - 20.0 ug/mL   CK    Collection Time: 10/30/23  6:23 PM   Result Value Ref Range     (H) 20 - 200 U/L   T4, Free    Collection Time: 10/30/23  6:23 PM   Result Value Ref Range    Free T4 0.90 0.71 - 1.51 ng/dL   Lipid Panel    Collection Time: 10/30/23  6:23 PM   Result Value Ref Range    Cholesterol 205 (H) 120 - 199 mg/dL    Triglycerides 62 30 - 150 mg/dL    HDL 44 40 - 75 mg/dL    LDL Cholesterol 148.6 63.0 - 159.0 mg/dL    HDL/Cholesterol Ratio 21.5 20.0 - 50.0 %    Total Cholesterol/HDL Ratio 4.7 2.0 - 5.0    Non-HDL Cholesterol 161 mg/dL   Hemoglobin A1C    Collection Time: 10/30/23  6:23 PM   Result Value Ref Range    Hemoglobin A1C 5.1 4.0 - 5.6 %    Estimated Avg Glucose 100 68 - 131 mg/dL   Urinalysis, Reflex to Urine Culture Urine, Clean Catch    Collection Time: 10/30/23  8:25 PM    Specimen: Urine   Result Value Ref Range    Specimen UA Urine, Clean Catch     Color, UA Yellow Yellow, Straw, Rosemarie    Appearance, UA Clear Clear    pH, UA 5.0 5.0 - 8.0    Specific Gravity, UA >=1.030 (A) 1.005 - 1.030    Protein, UA 1+ (A) Negative    Glucose, UA Negative Negative    Ketones, UA 3+ (A) Negative    Bilirubin (UA) Negative Negative    Occult Blood UA Negative Negative    Nitrite, UA Negative Negative    Leukocytes, UA Negative Negative   Urinalysis Microscopic    Collection Time: 10/30/23  8:25 PM   Result Value Ref Range    RBC, UA 10 (H) 0 - 4 /hpf    WBC, UA 1 0 - 5 /hpf    Bacteria Rare None-Occ /hpf    Squam Epithel, UA 0 /hpf    Hyaline Casts, UA 0 0-1/lpf /lpf    Microscopic Comment SEE COMMENT    Drug screen panel, emergency    Collection Time: 10/30/23  8:26 PM   Result Value Ref Range    Benzodiazepines Presumptive Positive (A) Negative    Methadone metabolites Negative Negative    Cocaine (Metab.) Presumptive  Positive (A) Negative    Opiate Scrn, Ur Negative Negative    Barbiturate Screen, Ur Negative Negative    Amphetamine Screen, Ur Negative Negative    THC Presumptive Positive (A) Negative    Phencyclidine Negative Negative    Creatinine, Urine >450.0 (H) 23.0 - 375.0 mg/dL    Toxicology Information SEE COMMENT    Fentanyl, Urine    Collection Time: 10/30/23  8:26 PM   Result Value Ref Range    Fentanyl, Urine Presumptive Positive (A) Negative    Creatinine, Urine >450.0 (H) 23.0 - 375.0 mg/dL   Comprehensive Metabolic Panel (CMP)    Collection Time: 10/31/23  4:19 AM   Result Value Ref Range    Sodium 141 136 - 145 mmol/L    Potassium 4.0 3.5 - 5.1 mmol/L    Chloride 108 95 - 110 mmol/L    CO2 17 (L) 23 - 29 mmol/L    Glucose 80 70 - 110 mg/dL    BUN 11 6 - 20 mg/dL    Creatinine 1.1 0.5 - 1.4 mg/dL    Calcium 9.1 8.7 - 10.5 mg/dL    Total Protein 7.5 6.0 - 8.4 g/dL    Albumin 4.2 3.5 - 5.2 g/dL    Total Bilirubin 0.7 0.1 - 1.0 mg/dL    Alkaline Phosphatase 34 (L) 55 - 135 U/L    AST 32 10 - 40 U/L    ALT 24 10 - 44 U/L    eGFR >60.0 >60 mL/min/1.73 m^2    Anion Gap 16 8 - 16 mmol/L   Magnesium    Collection Time: 10/31/23  4:19 AM   Result Value Ref Range    Magnesium 2.1 1.6 - 2.6 mg/dL   CBC with Automated Differential    Collection Time: 10/31/23  4:19 AM   Result Value Ref Range    WBC 14.15 (H) 3.90 - 12.70 K/uL    RBC 5.40 4.60 - 6.20 M/uL    Hemoglobin 15.2 14.0 - 18.0 g/dL    Hematocrit 40.6 40.0 - 54.0 %    MCV 75 (L) 82 - 98 fL    MCH 28.1 27.0 - 31.0 pg    MCHC 37.4 (H) 32.0 - 36.0 g/dL    RDW 12.8 11.5 - 14.5 %    Platelets 266 150 - 450 K/uL    MPV 11.5 9.2 - 12.9 fL    Immature Granulocytes 0.3 0.0 - 0.5 %    Gran # (ANC) 9.6 (H) 1.8 - 7.7 K/uL    Immature Grans (Abs) 0.04 0.00 - 0.04 K/uL    Lymph # 2.9 1.0 - 4.8 K/uL    Mono # 1.7 (H) 0.3 - 1.0 K/uL    Eos # 0.0 0.0 - 0.5 K/uL    Baso # 0.04 0.00 - 0.20 K/uL    nRBC 0 0 /100 WBC    Gran % 67.5 38.0 - 73.0 %    Lymph % 20.1 18.0 - 48.0 %    Mono %  11.7 4.0 - 15.0 %    Eosinophil % 0.1 0.0 - 8.0 %    Basophil % 0.3 0.0 - 1.9 %    Differential Method Automated      Imaging Results              CT Head Without Contrast (Final result)  Result time 10/30/23 18:45:05      Final result by Urban Dent MD (10/30/23 18:45:05)                   Impression:      1. Allowing for some degree of motion artifact, no convincing acute intracranial abnormalities.      Electronically signed by: Urban Dent MD  Date:    10/30/2023  Time:    18:45               Narrative:    EXAMINATION:  CT HEAD WITHOUT CONTRAST    CLINICAL HISTORY:  Seizure, new-onset, no history of trauma;    TECHNIQUE:  Low dose axial images were obtained through the head.  Coronal and sagittal reformations were also performed. Contrast was not administered.    COMPARISON:  None.    FINDINGS:  There is motion artifact.    There is no evidence of acute major vascular territory infarct, hemorrhage, or mass.  There is no hydrocephalus.  There are no abnormal extra-axial fluid collections.  The paranasal sinuses and mastoid air cells are clear, and there is no evidence of calvarial fracture.  The visualized soft tissues are unremarkable.                                       X-Ray Chest AP Portable (Final result)  Result time 10/30/23 16:58:06      Final result by Sebastian Armstrong MD (10/30/23 16:58:06)                   Impression:      No acute abnormality.      Electronically signed by: Sebastian Armstrong MD  Date:    10/30/2023  Time:    16:58               Narrative:    EXAMINATION:  XR CHEST AP PORTABLE    CLINICAL HISTORY:  seizure;    TECHNIQUE:  Single frontal view of the chest was performed.    COMPARISON:  None    FINDINGS:  Lungs are clear.  No effusion or pneumothorax.    No acute bone abnormality.

## 2023-10-31 NOTE — SUBJECTIVE & OBJECTIVE
No past medical history on file.    No past surgical history on file.    Review of patient's allergies indicates:  Not on File    No current facility-administered medications on file prior to encounter.     No current outpatient medications on file prior to encounter.     Family History    None       Tobacco Use    Smoking status: Not on file    Smokeless tobacco: Not on file   Substance and Sexual Activity    Alcohol use: Not on file    Drug use: Not on file    Sexual activity: Not on file     Review of Systems   Constitutional:  Positive for chills and diaphoresis. Negative for appetite change, fatigue and fever.   HENT:  Negative for congestion, rhinorrhea and sore throat.    Eyes:  Negative for photophobia and visual disturbance.   Respiratory:  Positive for shortness of breath. Negative for cough and wheezing.    Cardiovascular:  Negative for chest pain, palpitations and leg swelling.   Gastrointestinal:  Positive for diarrhea, nausea and vomiting. Negative for abdominal distention and abdominal pain.   Genitourinary:  Negative for dysuria, frequency and hematuria.   Musculoskeletal:  Positive for back pain and myalgias. Negative for joint swelling.   Skin:  Negative for color change, pallor, rash and wound.   Neurological:  Positive for seizures and headaches.   Psychiatric/Behavioral:  Negative for confusion and hallucinations. The patient is nervous/anxious.      Objective:     Vital Signs (Most Recent):  Temp: 99.7 °F (37.6 °C) (10/30/23 1514)  Pulse: 64 (10/30/23 1700)  Resp: 17 (10/30/23 1700)  BP: (!) 177/109 (10/30/23 1700)  SpO2: 98 % (10/30/23 1700) Vital Signs (24h Range):  Temp:  [99.7 °F (37.6 °C)] 99.7 °F (37.6 °C)  Pulse:  [60-64] 64  Resp:  [14-17] 17  SpO2:  [98 %-100 %] 98 %  BP: (160-177)/(109-112) 177/109        There is no height or weight on file to calculate BMI.     Physical Exam  Vitals and nursing note reviewed.   Constitutional:       General: He is sleeping. He is not in acute  distress.     Appearance: He is diaphoretic. He is not toxic-appearing.   HENT:      Head: Normocephalic and atraumatic.      Nose: Nose normal.      Mouth/Throat:      Mouth: Mucous membranes are dry.   Eyes:      Pupils: Pupils are equal, round, and reactive to light.   Cardiovascular:      Rate and Rhythm: Normal rate and regular rhythm.      Pulses: Normal pulses.   Pulmonary:      Effort: Pulmonary effort is normal. No respiratory distress.      Breath sounds: No wheezing, rhonchi or rales.   Abdominal:      General: Bowel sounds are normal. There is no distension.      Palpations: Abdomen is soft.      Tenderness: There is no abdominal tenderness. There is no guarding.   Musculoskeletal:         General: Normal range of motion.      Cervical back: Normal range of motion.   Skin:     General: Skin is warm.      Capillary Refill: Capillary refill takes less than 2 seconds.   Neurological:      General: No focal deficit present.      Mental Status: He is oriented to person, place, and time and easily aroused.      Motor: No weakness, tremor or seizure activity.   Psychiatric:         Attention and Perception: He is inattentive.         Behavior: Behavior is agitated and withdrawn.              CRANIAL NERVES     CN III, IV, VI   Pupils are equal, round, and reactive to light.       Significant Labs: All pertinent labs within the past 24 hours have been reviewed.  CBC:   Recent Labs   Lab 10/30/23  1823   WBC 10.79   HGB 15.4   HCT 41.8        CMP:   Recent Labs   Lab 10/30/23  1823      K 3.9      CO2 25   *   BUN 11   CREATININE 1.1   CALCIUM 9.8   PROT 8.3   ALBUMIN 4.6   BILITOT 0.6   ALKPHOS 39*   AST 29   ALT 27   ANIONGAP 8     TSH:   Recent Labs   Lab 10/30/23  1823   TSH 0.147*       Significant Imaging: I have reviewed all pertinent imaging results/findings within the past 24 hours.    Imaging Results              CT Head Without Contrast (Final result)  Result time 10/30/23  18:45:05      Final result by Urban Dent MD (10/30/23 18:45:05)                   Impression:      1. Allowing for some degree of motion artifact, no convincing acute intracranial abnormalities.      Electronically signed by: Urban Dent MD  Date:    10/30/2023  Time:    18:45               Narrative:    EXAMINATION:  CT HEAD WITHOUT CONTRAST    CLINICAL HISTORY:  Seizure, new-onset, no history of trauma;    TECHNIQUE:  Low dose axial images were obtained through the head.  Coronal and sagittal reformations were also performed. Contrast was not administered.    COMPARISON:  None.    FINDINGS:  There is motion artifact.    There is no evidence of acute major vascular territory infarct, hemorrhage, or mass.  There is no hydrocephalus.  There are no abnormal extra-axial fluid collections.  The paranasal sinuses and mastoid air cells are clear, and there is no evidence of calvarial fracture.  The visualized soft tissues are unremarkable.                                       X-Ray Chest AP Portable (Final result)  Result time 10/30/23 16:58:06      Final result by Sebastian Armstrong MD (10/30/23 16:58:06)                   Impression:      No acute abnormality.      Electronically signed by: Sebastian Armstrong MD  Date:    10/30/2023  Time:    16:58               Narrative:    EXAMINATION:  XR CHEST AP PORTABLE    CLINICAL HISTORY:  seizure;    TECHNIQUE:  Single frontal view of the chest was performed.    COMPARISON:  None    FINDINGS:  Lungs are clear.  No effusion or pneumothorax.    No acute bone abnormality.

## 2023-10-31 NOTE — SUBJECTIVE & OBJECTIVE
History reviewed. No pertinent past medical history.  History reviewed. No pertinent surgical history.  N/A  Family history is reviewed and has not changed   Social History     Substance Use Topics    Drug use: Yes     Types: Heroin     Review of patient's allergies indicates:  Not on File    Medications: I have reviewed the current medication administration record.    No medications prior to admission.       Review of Systems   Constitutional:  Positive for diaphoresis. Negative for activity change, appetite change, chills, fatigue and fever.   HENT:  Negative for congestion, rhinorrhea, sore throat and trouble swallowing.    Eyes:  Negative for photophobia and visual disturbance.   Respiratory:  Negative for apnea, cough, choking, chest tightness, shortness of breath and wheezing.    Cardiovascular:  Negative for chest pain, palpitations and leg swelling.   Gastrointestinal:  Positive for vomiting. Negative for abdominal distention, abdominal pain, constipation, diarrhea and nausea.   Genitourinary:  Negative for difficulty urinating, dysuria, frequency and hematuria.   Musculoskeletal:  Positive for myalgias. Negative for arthralgias, back pain and joint swelling.   Skin:  Negative for color change, pallor, rash and wound.   Neurological:  Positive for seizures. Negative for headaches.   Psychiatric/Behavioral:  Negative for agitation, confusion and hallucinations. The patient is not nervous/anxious.      Objective:     Vital Signs (Most Recent):  Temp: 99 °F (37.2 °C) (10/31/23 1138)  Pulse: (!) 59 (10/31/23 1138)  Resp: 16 (10/31/23 1138)  BP: (!) 156/90 (10/31/23 1138)  SpO2: 100 % (10/31/23 1138)    Vital Signs Range (Last 24H):  Temp:  [98.1 °F (36.7 °C)-99.7 °F (37.6 °C)]   Pulse:  [57-84]   Resp:  [14-20]   BP: (127-218)/()   SpO2:  [95 %-100 %]        Physical Exam  Vitals and nursing note reviewed.   Constitutional:       General: He is sleeping. He is not in acute distress.     Appearance: He is  diaphoretic. He is not toxic-appearing.   HENT:      Head: Normocephalic and atraumatic.      Nose: Nose normal.      Mouth/Throat:      Mouth: Mucous membranes are dry.   Eyes:      Pupils: Pupils are equal, round, and reactive to light.   Cardiovascular:      Rate and Rhythm: Normal rate and regular rhythm.      Pulses: Normal pulses.   Pulmonary:      Effort: Pulmonary effort is normal. No respiratory distress.      Breath sounds: No wheezing, rhonchi or rales.   Abdominal:      General: Bowel sounds are normal. There is no distension.      Palpations: Abdomen is soft.      Tenderness: There is no abdominal tenderness. There is no guarding.   Musculoskeletal:         General: Normal range of motion.      Cervical back: Normal range of motion.   Skin:     General: Skin is warm.      Capillary Refill: Capillary refill takes less than 2 seconds.   Neurological:      Mental Status: He is easily aroused.      Motor: No tremor or seizure activity.   Psychiatric:         Attention and Perception: He is inattentive.         Behavior: Behavior is agitated and withdrawn.     Neurological Exam:   LOC: alert  Attention Span: Good   Language: No aphasia  Articulation: No dysarthria  Orientation: Person, Place, Time   Visual Fields: Full  EOM (CN III, IV, VI): Full/intact  Pupils (CN II, III): PERRL  Facial Sensation (CN V): Normal  Facial Movement (CN VII): Symmetric facial expression    Reflexes: 2+ throughout  Motor: Arm left  Normal 5/5  Leg left  Normal 5/5  Arm right  Normal 5/5  Leg right Normal 5/5  Cerebellum: No evidence of appendicular or axial ataxia  Sensation: Intact to light touch, temperature and vibration  Tone: Normal tone throughout    Laboratory:  CMP:   Recent Labs   Lab 10/31/23  0419   CALCIUM 9.1   ALBUMIN 4.2   PROT 7.5      K 4.0   CO2 17*      BUN 11   CREATININE 1.1   ALKPHOS 34*   ALT 24   AST 32   BILITOT 0.7     CBC:   Recent Labs   Lab 10/31/23  0419   WBC 14.15*   RBC 5.40   HGB  15.2   HCT 40.6      MCV 75*   MCH 28.1   MCHC 37.4*     Lipid Panel:   Recent Labs   Lab 10/30/23  1823   CHOL 205*   LDLCALC 148.6   HDL 44   TRIG 62     Hgb A1C:   Recent Labs   Lab 10/30/23  1823   HGBA1C 5.1     TSH:   Recent Labs   Lab 10/30/23  1823   TSH 0.147*       Diagnostic Results:      Brain imaging:    [facility] epilepsy protocol 10/31/23:        Impression:     Unremarkable motion distorted MRI brain as detailed above specifically without evidence for acute infarction or definite intracranial enhancing lesion

## 2023-10-31 NOTE — PLAN OF CARE
[facility] - Med Surg  Initial Discharge Assessment       Primary Care Provider: Goldy Gallego MD    Admission Diagnosis: Seizure [R56.9]  Chest pain [R07.9]    Admission Date: 10/30/2023  Expected Discharge Date: 11/1/2023    Transition of Care Barriers: None    Payor: MEDICAID / Plan: [employer] [facility] / Product Type: Managed Medicaid /     No emergency contact information on file.    Discharge Plan A: Home  Discharge Plan B: Home with family    No Pharmacies Listed      CM met with patient. Patient admitted to [facility] from [place]. Prior to hospitalization, patient was independent with ADLs. Patient has transportation to home once medically stable. Will continue to follow.Discharge Plan A and Plan B have been determined by review of patient's clinical status, future medical and therapeutic needs, and coverage/benefits for post-acute care in coordination with multidisciplinary team members.    Initial Assessment (most recent)       Adult Discharge Assessment - 10/31/23 1413          Discharge Assessment    Assessment Type Discharge Planning Assessment     Confirmed/corrected address, phone number and insurance Yes     Confirmed Demographics Correct on Facesheet     Source of Information patient     Communicated NAOMY with patient/caregiver Date not available/Unable to determine     Reason For Admission Seizure- like activity     People in Home alone     Current cognitive status: Alert/Oriented     Walking or Climbing Stairs --   IND    Dressing/Bathing --   IND    Equipment Currently Used at Home none     Readmission within 30 days? No     Do you currently have service(s) that help you manage your care at home? No     Do you take prescription medications? No     Do you have prescription coverage? No     Do you have any problems affording any of your prescribed medications? No     How do you get to doctors appointments? car, drives self;family or friend will provide     Are you on dialysis? No     Do  you take coumadin? No     DME Needed Upon Discharge  none     Discharge Plan discussed with: Patient     Transition of Care Barriers None     Discharge Plan A Home     Discharge Plan B Home with family        Financial Resource Strain    How hard is it for you to pay for the very basics like food, housing, medical care, and heating? Not hard at all        Housing Stability    In the last 12 months, was there a time when you were not able to pay the mortgage or rent on time? No     In the last 12 months, was there a time when you did not have a steady place to sleep or slept in a shelter (including now)? No        Transportation Needs    In the past 12 months, has lack of transportation kept you from medical appointments or from getting medications? No     In the past 12 months, has lack of transportation kept you from meetings, work, or from getting things needed for daily living? No        Food Insecurity    Within the past 12 months, you worried that your food would run out before you got the money to buy more. Never true     Within the past 12 months, the food you bought just didn't last and you didn't have money to get more. Never true        Stress    Do you feel stress - tense, restless, nervous, or anxious, or unable to sleep at night because your mind is troubled all the time - these days? Not at all        Social Connections    In a typical week, how many times do you talk on the phone with family, friends, or neighbors? More than three times a week     How often do you get together with friends or relatives? More than three times a week     Do you belong to any clubs or organizations such as Rastafarian groups, unions, fraternal or athletic groups, or school groups? No     How often do you attend meetings of the clubs or organizations you belong to? Never     Are you , , , , never , or living with a partner? Never                      SARANYA Persaud  Case  Management  [phone]

## 2023-10-31 NOTE — HPI
"The patient is a 39 yo man. Neurology consulted for possible seizure-like activity. He has a history of heroin addiction/opioid use disorder. He presented to [facility] for possible seizure-like activity. Per primary team "Patient denies any history of seizures. He was unable to recall what happened early today. I called [facility] and spoke with the house supervisor who stated the patient was okay this morning when the provider rounded. He took a PRN dose of suboxone around 9:30am. She states later in the day she witnessed these episodes of seizure-like activity and did not think they were consistent with seizures as patient was moving different parts of his body at different times and was tracking with his eyes. She reports he had no postictal period, tongue biting, or urinary incontinence." At arrival, . CTH allowing for some degree of motion artifact, no convincing acute intracranial abnormalities. The patient received keppra 2g, and ativan 2mg. An additional seizure like episode was witnessed by the ED provider. Neurology consulted   "

## 2023-10-31 NOTE — HPI
"Lou Cunha is a 38 y.o. male with a PMHx of heroin addiction/opioid use disorder who presents to the ED from [facility] for seizure like activity. Patient denies any history of seizures. He was unable to recall what happened early today. I called [facility] and spoke with the house supervisor who stated the patient was okay this morning when the provider rounded. He took a PRN dose of suboxone around 9:30am. She states later in the day she witnessed these episodes of seizure-like activity and did not think they were consistent with seizures as patient was moving different parts of his body at different times and was tracking with his eyes. She reports he had no postictal period, tongue biting, or urinary incontinence. The patient endorses chills, diaphoresis, myalgias, N/V/D, headache, or SOB. The patient reports he last used heroin the day before being admitted to [facility]. Denies any other illicit drug use or ETOH use.     In the ED, patient hypertensive otherwise vitals stable, afebrile. CBC unremarkable. Cr 1.1. Glucose 115. Acetaminophen 8.3. ETOH <10. TSH 0.147 but  free T4 0.90. . Hepatitis C Ab and HIV 1/2 Ag/Ab Non-reactive. Flu/COVID negative. UA and UDS pending. EKG with shows SR, 57 bpm, no acute ischemic changes. CTH allowing for some degree of motion artifact, no convincing acute intracranial abnormalities. CXR with no acute abnormality. The patient received toradol 10mg, tylenol 1g, zofran 4mg, keppra 2g, and ativan 2mg. An additional seizure like episode was witnessed by the ED provider, "Called to bedside the patient had another witnessed seizure lasting less than 1 minute. Patient had no incontinence, no tongue biting he had no change in his vital signs throughout the episode. On my arrival were able to immediately wake the patient up and he is not postictal in any way.  Unclear if this was a true seizure versus a nonepileptic psychogenic seizure."  "

## 2023-10-31 NOTE — PLAN OF CARE
Problem: Adult Inpatient Plan of Care  Goal: Plan of Care Review  Outcome: Ongoing, Progressing  Goal: Patient-Specific Goal (Individualized)  Outcome: Ongoing, Progressing  Goal: Absence of Hospital-Acquired Illness or Injury  Outcome: Ongoing, Progressing  Goal: Optimal Comfort and Wellbeing  Outcome: Ongoing, Progressing  Goal: Readiness for Transition of Care  Outcome: Ongoing, Progressing     Problem: Skin Injury Risk Increased  Goal: Skin Health and Integrity  Outcome: Ongoing, Progressing      Received pt from ED, Aox4, generalized weakness noted. BP: 172/104, complained of back pain and nausea, MD and NP aware. Seizure precaution maintained, kept environment calm and conducive for rest with sufficient lights to visualize the pt at all times. Fall prevention protocol maintained. Sitter assigned at the bedside at all times

## 2023-10-31 NOTE — SUBJECTIVE & OBJECTIVE
Interval History: New admission for possible seizures, witnessed this am. Seem to stop with prompting and was not post ictal. Will proceed with EEG, no overt signs of withdrawal. No AED meds for now. Discuused with psychiatry and Neuro.    Review of Systems  Objective:     Vital Signs (Most Recent):  Temp: 98.1 °F (36.7 °C) (10/31/23 0401)  Pulse: 84 (10/31/23 0645)  Resp: 19 (10/31/23 0645)  BP: 134/64 (10/31/23 0645)  SpO2: 98 % (10/31/23 0645) Vital Signs (24h Range):  Temp:  [98.1 °F (36.7 °C)-99.7 °F (37.6 °C)] 98.1 °F (36.7 °C)  Pulse:  [57-84] 84  Resp:  [14-20] 19  SpO2:  [95 %-100 %] 98 %  BP: (127-218)/() 134/64        There is no height or weight on file to calculate BMI.    Intake/Output Summary (Last 24 hours) at 10/31/2023 0830  Last data filed at 10/31/2023 0424  Gross per 24 hour   Intake 50 ml   Output 650 ml   Net -600 ml         Physical Exam  Vitals and nursing note reviewed.   Constitutional:       General: He is sleeping. He is not in acute distress.     Appearance: He is diaphoretic. He is not toxic-appearing.   HENT:      Head: Normocephalic and atraumatic.      Nose: Nose normal.      Mouth/Throat:      Mouth: Mucous membranes are dry.   Eyes:      Pupils: Pupils are equal, round, and reactive to light.   Cardiovascular:      Rate and Rhythm: Normal rate and regular rhythm.      Pulses: Normal pulses.   Pulmonary:      Effort: Pulmonary effort is normal. No respiratory distress.      Breath sounds: No wheezing, rhonchi or rales.   Abdominal:      General: Bowel sounds are normal. There is no distension.      Palpations: Abdomen is soft.      Tenderness: There is no abdominal tenderness. There is no guarding.   Musculoskeletal:         General: Normal range of motion.      Cervical back: Normal range of motion.   Skin:     General: Skin is warm.      Capillary Refill: Capillary refill takes less than 2 seconds.   Neurological:      General: No focal deficit present.      Mental Status:  He is oriented to person, place, and time and easily aroused.      Motor: No weakness, tremor or seizure activity.   Psychiatric:         Attention and Perception: He is inattentive.         Behavior: Behavior is agitated and withdrawn.             Significant Labs: All pertinent labs within the past 24 hours have been reviewed.  CBC:   Recent Labs   Lab 10/30/23  1823 10/31/23  0419   WBC 10.79 14.15*   HGB 15.4 15.2   HCT 41.8 40.6    266       Significant Imaging: I have reviewed all pertinent imaging results/findings within the past 24 hours.

## 2023-10-31 NOTE — ASSESSMENT & PLAN NOTE
Patient presents from [place] for seizure-like activity. Patient denies any history of seizures. He was unable to recall what happened early today. I called [facility] and spoke with the house supervisor who stated the patient was okay this morning when the provider rounded. He took a PRN dose of suboxone around 9:30am. She states later in the day she witnessed these episodes of seizure-like activity and did not think they were consistent with seizures as patient was moving different parts of his body at different times and was tracking with his eyes. She reports he had no postictal period, tongue biting, or urinary incontinence.  -Patient received 2g keppra in the ED and 2mg IV ativan.  -Labs largely unremarkable. .  -UDS +cocaine, fentanyl, THC, and benzos.  -CTH negative for acute process.  -EEG pending.  -Monitor neuro status closely.  -Seizure/fall precautions.

## 2023-10-31 NOTE — PROCEDURES
Prelim EEG   10:52 - 11:32   Mild slowing of the background was noted suggestive of mild encephalopathy.  For events were recorded associated with upper body irregular jerking.  There was no EEG correlate suggestive of nonepileptic events.    Dr. Brooke

## 2023-10-31 NOTE — CONSULTS
"[facility]  Neurology  Consult Note    Patient Name: Lou Cunha  MRN: [id number]  Admission Date: 10/30/2023  Hospital Length of Stay: 0 days  Code Status: Full Code   Attending Provider: Goldy Gallego MD   Consulting Provider: Roberto Storey MD  Primary Care Physician: Goldy Gallego MD  Principal Problem:Seizure-like activity    Inpatient consult to Neurology  Consult performed by: Roberto Storey MD  Consult ordered by: Goldy Gallego MD  Reason for consult: seizure-like activity          Subjective:     Chief Complaint: seizure-like activity     HPI:   The patient is a 39 yo man. Neurology consulted for possible seizure-like activity. He has a history of heroin addiction/opioid use disorder. [place] [facility] [place]. Per primary team "Patient denies any history of seizures. He was unable to recall what happened early today. I called [facility] and spoke with the house supervisor who stated the patient was okay this morning when the provider rounded. He took a PRN dose of suboxone around 9:30am. She states later in the day she witnessed these episodes of seizure-like activity and did not think they were consistent with seizures as patient was moving different parts of his body at different times and was tracking with his eyes. She reports he had no postictal period, tongue biting, or urinary incontinence." At arrival, . CTH allowing for some degree of motion artifact, no convincing acute intracranial abnormalities. The patient received keppra 2g, and ativan 2mg. An additional seizure like episode was witnessed by the ED provider. Neurology consulted       History reviewed. No pertinent past medical history.  History reviewed. No pertinent surgical history.  N/A  Family history is reviewed and has not changed   Social History     Substance Use Topics    Drug use: Yes     Types: Heroin     Review of patient's allergies indicates:  Not on " File    Medications: I have reviewed the current medication administration record.    No medications prior to admission.       Review of Systems   Constitutional:  Positive for diaphoresis. Negative for activity change, appetite change, chills, fatigue and fever.   HENT:  Negative for congestion, rhinorrhea, sore throat and trouble swallowing.    Eyes:  Negative for photophobia and visual disturbance.   Respiratory:  Negative for apnea, cough, choking, chest tightness, shortness of breath and wheezing.    Cardiovascular:  Negative for chest pain, palpitations and leg swelling.   Gastrointestinal:  Positive for vomiting. Negative for abdominal distention, abdominal pain, constipation, diarrhea and nausea.   Genitourinary:  Negative for difficulty urinating, dysuria, frequency and hematuria.   Musculoskeletal:  Positive for myalgias. Negative for arthralgias, back pain and joint swelling.   Skin:  Negative for color change, pallor, rash and wound.   Neurological:  Positive for seizures. Negative for headaches.   Psychiatric/Behavioral:  Negative for agitation, confusion and hallucinations. The patient is not nervous/anxious.      Objective:     Vital Signs (Most Recent):  Temp: 99 °F (37.2 °C) (10/31/23 1138)  Pulse: (!) 59 (10/31/23 1138)  Resp: 16 (10/31/23 1138)  BP: (!) 156/90 (10/31/23 1138)  SpO2: 100 % (10/31/23 1138)    Vital Signs Range (Last 24H):  Temp:  [98.1 °F (36.7 °C)-99.7 °F (37.6 °C)]   Pulse:  [57-84]   Resp:  [14-20]   BP: (127-218)/()   SpO2:  [95 %-100 %]        Physical Exam  Vitals and nursing note reviewed.   Constitutional:       General: He is sleeping. He is not in acute distress.     Appearance: He is diaphoretic. He is not toxic-appearing.   HENT:      Head: Normocephalic and atraumatic.      Nose: Nose normal.      Mouth/Throat:      Mouth: Mucous membranes are dry.   Eyes:      Pupils: Pupils are equal, round, and reactive to light.   Cardiovascular:      Rate and Rhythm: Normal  rate and regular rhythm.      Pulses: Normal pulses.   Pulmonary:      Effort: Pulmonary effort is normal. No respiratory distress.      Breath sounds: No wheezing, rhonchi or rales.   Abdominal:      General: Bowel sounds are normal. There is no distension.      Palpations: Abdomen is soft.      Tenderness: There is no abdominal tenderness. There is no guarding.   Musculoskeletal:         General: Normal range of motion.      Cervical back: Normal range of motion.   Skin:     General: Skin is warm.      Capillary Refill: Capillary refill takes less than 2 seconds.   Neurological:      Mental Status: He is easily aroused.      Motor: No tremor or seizure activity.   Psychiatric:         Attention and Perception: He is inattentive.         Behavior: Behavior is agitated and withdrawn.     Neurological Exam:   LOC: alert  Attention Span: Good   Language: No aphasia  Articulation: No dysarthria  Orientation: Person, Place, Time   Visual Fields: Full  EOM (CN III, IV, VI): Full/intact  Pupils (CN II, III): PERRL  Facial Sensation (CN V): Normal  Facial Movement (CN VII): Symmetric facial expression    Reflexes: 2+ throughout  Motor: Arm left  Normal 5/5  Leg left  Normal 5/5  Arm right  Normal 5/5  Leg right Normal 5/5  Cerebellum: No evidence of appendicular or axial ataxia  Sensation: Intact to light touch, temperature and vibration  Tone: Normal tone throughout    Laboratory:  CMP:   Recent Labs   Lab 10/31/23  0419   CALCIUM 9.1   ALBUMIN 4.2   PROT 7.5      K 4.0   CO2 17*      BUN 11   CREATININE 1.1   ALKPHOS 34*   ALT 24   AST 32   BILITOT 0.7     CBC:   Recent Labs   Lab 10/31/23  0419   WBC 14.15*   RBC 5.40   HGB 15.2   HCT 40.6      MCV 75*   MCH 28.1   MCHC 37.4*     Lipid Panel:   Recent Labs   Lab 10/30/23  1823   CHOL 205*   LDLCALC 148.6   HDL 44   TRIG 62     Hgb A1C:   Recent Labs   Lab 10/30/23  1823   HGBA1C 5.1     TSH:   Recent Labs   Lab 10/30/23  1823   TSH 0.147*  "      Diagnostic Results:      Brain imaging:    [facility] epilepsy protocol 10/31/23:        Impression:     Unremarkable motion distorted MRI brain as detailed above specifically without evidence for acute infarction or definite intracranial enhancing lesion    Assessment and Plan:     * Seizure-like activity  The patient is a 37 yo man. Neurology consulted for possible seizure-like activity. He has a history of heroin addiction/opioid use disorder. He presented to [facility] for possible seizure-like activity. Per primary team "Patient denies any history of seizures. He was unable to recall what happened early today. I called [facility] and spoke with the house supervisor who stated the patient was okay this morning when the provider rounded. He took a PRN dose of suboxone around 9:30am. She states later in the day she witnessed these episodes of seizure-like activity and did not think they were consistent with seizures as patient was moving different parts of his body at different times and was tracking with his eyes. She reports he had no postictal period, tongue biting, or urinary incontinence." At arrival, . CTH allowing for some degree of motion artifact, no convincing acute intracranial abnormalities. The patient received keppra 2g, and ativan 2mg. An additional seizure like episode was witnessed by the ED provider. Neurology consulted     Assessment and plan:    37 yo man with concerns for possible seizures-like activity. No history of seizures. He stated that when he was a kid, he had a couple of fainting episodes but was never diagnosed with seizures. One of his aunts who recently passed away, had seizures. History and description of episodes were concerning for non epileptic events. MRI epilepsy protocol and EEG were recommended since possible first time seizures. MRI epilepsy protocol showed no abnormality, and EEG showed 4 episodes of shacking with no EEG correlate. Case most consistent with non epileptic " seizures    Plan:  - No AEDs at this time  - DC EEG  - No further neuro work up   - Counseling regarding drug use  - Agree with psychiatry consult.   - Can follow up with neuro psychology as outpatient unless high concerns for fabrication of symptoms.   - Neurology will sign off. Please call if any questions or concerns.         VTE Risk Mitigation (From admission, onward)         Ordered     IP VTE LOW RISK PATIENT  Once         10/30/23 2017     Place sequential compression device  Until discontinued         10/30/23 2017                Thank you for your consult. I will sign off. Please contact us if you have any additional questions.    Roberto Storey MD  Neurology  [facility] - Med Surg

## 2023-10-31 NOTE — ASSESSMENT & PLAN NOTE
"The patient is a 37 yo man. Neurology consulted for possible seizure-like activity. He has a history of heroin addiction/opioid use disorder. He presented to [facility] [place] possible seizure-like activity. Per primary team "Patient denies any history of seizures. He was unable to recall what happened early today. I called [facility] and spoke with the house supervisor who stated the patient was okay this morning when the provider rounded. He took a PRN dose of suboxone around 9:30am. She states later in the day she witnessed these episodes of seizure-like activity and did not think they were consistent with seizures as patient was moving different parts of his body at different times and was tracking with his eyes. She reports he had no postictal period, tongue biting, or urinary incontinence." At arrival, . CTH allowing for some degree of motion artifact, no convincing acute intracranial abnormalities. The patient received keppra 2g, and ativan 2mg. An additional seizure like episode was witnessed by the ED provider. Neurology consulted     Assessment and plan:    37 yo man with concerns for possible seizures-like activity. No history of seizures. He stated that when he was a kid, he had a couple of fainting episodes but was never diagnosed with seizures. One of his aunts who recently passed away, had seizures. History and description of episodes were concerning for non epileptic events. MRI epilepsy protocol and EEG were recommended since possible first time seizures. MRI epilepsy protocol showed no abnormality, and EEG showed 4 episodes of shacking with no EEG correlate. Case most consistent with non epileptic seizures    Plan:  - No AEDs at this time  - DC EEG  - No further neuro work up   - Counseling regarding drug use  - Agree with psychiatry consult.   - Can follow up with neuro psychology as outpatient unless high concerns for fabrication of symptoms.   - Neurology will sign off. Please call if any questions " or concerns.

## 2023-10-31 NOTE — SUBJECTIVE & OBJECTIVE
History reviewed. No pertinent past medical history.  History reviewed. No pertinent surgical history.  N/A  Family history is reviewed and has not changed   Social History     Substance Use Topics    Drug use: Yes     Types: Heroin     Review of patient's allergies indicates:  Not on File    Medications: I have reviewed the current medication administration record.    No medications prior to admission.       Review of Systems   Constitutional:  Positive for diaphoresis. Negative for activity change, appetite change, chills, fatigue and fever.   HENT:  Negative for congestion, rhinorrhea, sore throat and trouble swallowing.    Eyes:  Negative for photophobia and visual disturbance.   Respiratory:  Negative for apnea, cough, choking, chest tightness, shortness of breath and wheezing.    Cardiovascular:  Negative for chest pain, palpitations and leg swelling.   Gastrointestinal:  Positive for vomiting. Negative for abdominal distention, abdominal pain, constipation, diarrhea and nausea.   Genitourinary:  Negative for difficulty urinating, dysuria, frequency and hematuria.   Musculoskeletal:  Positive for myalgias. Negative for arthralgias, back pain and joint swelling.   Skin:  Negative for color change, pallor, rash and wound.   Neurological:  Positive for seizures. Negative for headaches.   Psychiatric/Behavioral:  Negative for agitation, confusion and hallucinations. The patient is not nervous/anxious.      Objective:     Vital Signs (Most Recent):  Temp: 99 °F (37.2 °C) (10/31/23 1138)  Pulse: (!) 59 (10/31/23 1138)  Resp: 16 (10/31/23 1138)  BP: (!) 156/90 (10/31/23 1138)  SpO2: 100 % (10/31/23 1138)    Vital Signs Range (Last 24H):  Temp:  [98.1 °F (36.7 °C)-99.7 °F (37.6 °C)]   Pulse:  [57-84]   Resp:  [14-20]   BP: (127-218)/()   SpO2:  [95 %-100 %]        Physical Exam  Vitals and nursing note reviewed.   Constitutional:       General: He is sleeping. He is not in acute distress.     Appearance:  He is diaphoretic. He is not toxic-appearing.   HENT:      Head: Normocephalic and atraumatic.      Nose: Nose normal.      Mouth/Throat:      Mouth: Mucous membranes are dry.   Eyes:      Pupils: Pupils are equal, round, and reactive to light.   Cardiovascular:      Rate and Rhythm: Normal rate and regular rhythm.      Pulses: Normal pulses.   Pulmonary:      Effort: Pulmonary effort is normal. No respiratory distress.      Breath sounds: No wheezing, rhonchi or rales.   Abdominal:      General: Bowel sounds are normal. There is no distension.      Palpations: Abdomen is soft.      Tenderness: There is no abdominal tenderness. There is no guarding.   Musculoskeletal:         General: Normal range of motion.      Cervical back: Normal range of motion.   Skin:     General: Skin is warm.      Capillary Refill: Capillary refill takes less than 2 seconds.   Neurological:      Mental Status: He is easily aroused.      Motor: No tremor or seizure activity.   Psychiatric:         Attention and Perception: He is inattentive.         Behavior: Behavior is agitated and withdrawn.          Neurological Exam:   LOC: alert  Attention Span: Good   Language: No aphasia  Articulation: No dysarthria  Orientation: Person, Place, Time   Visual Fields: Full  EOM (CN III, IV, VI): Full/intact  Pupils (CN II, III): PERRL  Facial Sensation (CN V): Normal  Facial Movement (CN VII): Symmetric facial expression    Reflexes: 2+ throughout  Motor: Arm left  Normal 5/5  Leg left  Normal 5/5  Arm right  Normal 5/5  Leg right Normal 5/5  Cerebellum: No evidence of appendicular or axial ataxia  Sensation: Intact to light touch, temperature and vibration  Tone: Normal tone throughout    Laboratory:  CMP:   Recent Labs   Lab 10/31/23  0419   CALCIUM 9.1   ALBUMIN 4.2   PROT 7.5      K 4.0   CO2 17*      BUN 11   CREATININE 1.1   ALKPHOS 34*   ALT 24   AST 32   BILITOT 0.7     CBC:   Recent Labs   Lab 10/31/23  0419   WBC 14.15*   RBC  5.40   HGB 15.2   HCT 40.6      MCV 75*   MCH 28.1   MCHC 37.4*     Lipid Panel:   Recent Labs   Lab 10/30/23  1823   CHOL 205*   LDLCALC 148.6   HDL 44   TRIG 62     Hgb A1C:   Recent Labs   Lab 10/30/23  1823   HGBA1C 5.1     TSH:   Recent Labs   Lab 10/30/23  1823   TSH 0.147*       Diagnostic Results:      Brain imaging:    [facility] epilepsy protocol 10/31/23:        Impression:     Unremarkable motion distorted MRI brain as detailed above specifically without evidence for acute infarction or definite intracranial enhancing lesion

## 2023-10-31 NOTE — ASSESSMENT & PLAN NOTE
Patient presents from [place] where he was admitted for voluntary detox on 10/28. Patient reports he last snorted heroin 10/29 but when asked further he states the day before he went to [place]. Denies any other illicit drug use or ETOH use.    -Patient PECd in the ED, continue for now.  -UDS +cocaine, fentanyl, THC, and benzos.  -ETOH <10  -Psychiatry consulted due to opioid withdrawal. Last dose Suboxone 4-2mg at [place] at 9:38. Will give a dose of Suboxone tonight.  -PRN trazodone, zanaflex, clonidine, imodium, bentyl, and hydroxyzine.  -PRN antiemetics.

## 2023-10-31 NOTE — PROGRESS NOTES
"[facility] Medicine  Progress Note    Patient Name: Lou Cunha  MRN: [id number]  Patient Class: OP- Observation   Admission Date: 10/30/2023  Length of Stay: 0 days  Attending Physician: Goldy Gallego MD  Primary Care Provider: Goldy Gallego MD        Subjective:     Principal Problem:Seizure-like activity        HPI:  Lou Cunha is a 38 y.o. male with a PMHx of heroin addiction/opioid use disorder who presents to the ED from [place] for seizure like activity. Patient denies any history of seizures. He was unable to recall what happened early today. I called [facility] and spoke with the house supervisor who stated the patient was okay this morning when the provider rounded. He took a PRN dose of suboxone around 9:30am. She states later in the day she witnessed these episodes of seizure-like activity and did not think they were consistent with seizures as patient was moving different parts of his body at different times and was tracking with his eyes. She reports he had no postictal period, tongue biting, or urinary incontinence. The patient endorses chills, diaphoresis, myalgias, N/V/D, headache, or SOB. The patient reports he last used heroin the day before being admitted to [place]. Denies any other illicit drug use or ETOH use.     In the ED, patient hypertensive otherwise vitals stable, afebrile. CBC unremarkable. Cr 1.1. Glucose 115. Acetaminophen 8.3. ETOH <10. TSH 0.147 but  free T4 0.90. . Hepatitis C Ab and HIV 1/2 Ag/Ab Non-reactive. Flu/COVID negative. UA and UDS pending. EKG with shows SR, 57 bpm, no acute ischemic changes. CTH allowing for some degree of motion artifact, no convincing acute intracranial abnormalities. CXR with no acute abnormality. The patient received toradol 10mg, tylenol 1g, zofran 4mg, keppra 2g, and ativan 2mg. An additional seizure like episode was witnessed by the ED provider, "Called to bedside the patient had another witnessed " "seizure lasting less than 1 minute. Patient had no incontinence, no tongue biting he had no change in his vital signs throughout the episode. On my arrival were able to immediately wake the patient up and he is not postictal in any way.  Unclear if this was a true seizure versus a nonepileptic psychogenic seizure."      Overview/Hospital Course:  No notes on file    Interval History: New admission for possible seizures, witnessed this am. Seem to stop with prompting and was not post ictal. Will proceed with EEG, no overt signs of withdrawal. No AED meds for now. Discuused with psychiatry and Neuro.    Review of Systems  Objective:     Vital Signs (Most Recent):  Temp: 98.1 °F (36.7 °C) (10/31/23 0401)  Pulse: 84 (10/31/23 0645)  Resp: 19 (10/31/23 0645)  BP: 134/64 (10/31/23 0645)  SpO2: 98 % (10/31/23 0645) Vital Signs (24h Range):  Temp:  [98.1 °F (36.7 °C)-99.7 °F (37.6 °C)] 98.1 °F (36.7 °C)  Pulse:  [57-84] 84  Resp:  [14-20] 19  SpO2:  [95 %-100 %] 98 %  BP: (127-218)/() 134/64        There is no height or weight on file to calculate BMI.    Intake/Output Summary (Last 24 hours) at 10/31/2023 0830  Last data filed at 10/31/2023 0424  Gross per 24 hour   Intake 50 ml   Output 650 ml   Net -600 ml         Physical Exam  Vitals and nursing note reviewed.   Constitutional:       General: He is sleeping. He is not in acute distress.     Appearance: He is diaphoretic. He is not toxic-appearing.   HENT:      Head: Normocephalic and atraumatic.      Nose: Nose normal.      Mouth/Throat:      Mouth: Mucous membranes are dry.   Eyes:      Pupils: Pupils are equal, round, and reactive to light.   Cardiovascular:      Rate and Rhythm: Normal rate and regular rhythm.      Pulses: Normal pulses.   Pulmonary:      Effort: Pulmonary effort is normal. No respiratory distress.      Breath sounds: No wheezing, rhonchi or rales.   Abdominal:      General: Bowel sounds are normal. There is no distension.      Palpations: " Abdomen is soft.      Tenderness: There is no abdominal tenderness. There is no guarding.   Musculoskeletal:         General: Normal range of motion.      Cervical back: Normal range of motion.   Skin:     General: Skin is warm.      Capillary Refill: Capillary refill takes less than 2 seconds.   Neurological:      General: No focal deficit present.      Mental Status: He is oriented to person, place, and time and easily aroused.      Motor: No weakness, tremor or seizure activity.   Psychiatric:         Attention and Perception: He is inattentive.         Behavior: Behavior is agitated and withdrawn.             Significant Labs: All pertinent labs within the past 24 hours have been reviewed.  CBC:   Recent Labs   Lab 10/30/23  1823 10/31/23  0419   WBC 10.79 14.15*   HGB 15.4 15.2   HCT 41.8 40.6    266       Significant Imaging: I have reviewed all pertinent imaging results/findings within the past 24 hours.      Assessment/Plan:      * Seizure-like activity  Patient presents from [place] for seizure-like activity. Patient denies any history of seizures. He was unable to recall what happened early today. I called [facility] and spoke with the house supervisor who stated the patient was okay this morning when the provider rounded. He took a PRN dose of suboxone around 9:30am. She states later in the day she witnessed these episodes of seizure-like activity and did not think they were consistent with seizures as patient was moving different parts of his body at different times and was tracking with his eyes. She reports he had no postictal period, tongue biting, or urinary incontinence.  -Patient received 2g keppra in the ED and 2mg IV ativan.  -Labs largely unremarkable. .  -UDS +cocaine, fentanyl, THC, and benzos.  -CTH negative for acute process.  -EEG pending.  -Monitor neuro status closely.  -Seizure/fall precautions.     Severe opioid use disorder  Patient presents from [place] where he  was admitted for voluntary detox on 10/28. Patient reports he last snorted heroin 10/29 but when asked further he states the day before he went to [place]. Denies any other illicit drug use or ETOH use.    -Patient PECd in the ED, continue for now.  -UDS +cocaine, fentanyl, THC, and benzos.  -ETOH <10  -Psychiatry consulted due to opioid withdrawal. Last dose Suboxone 4-2mg at [place] at 9:38. Will give a dose of Suboxone tonight.  -PRN trazodone, zanaflex, clonidine, imodium, bentyl, and hydroxyzine.  -PRN antiemetics.       VTE Risk Mitigation (From admission, onward)         Ordered     IP VTE LOW RISK PATIENT  Once         10/30/23 2017     Place sequential compression device  Until discontinued         10/30/23 2017                Discharge Planning   NAOMY:      Code Status: Full Code   Is the patient medically ready for discharge?:     Reason for patient still in hospital (select all that apply): Patient unstable                     Goldy Gallego MD  Department of Hospital Medicine   [facility]

## 2023-11-01 VITALS
TEMPERATURE: 98 F | OXYGEN SATURATION: 98 % | HEART RATE: 66 BPM | RESPIRATION RATE: 16 BRPM | DIASTOLIC BLOOD PRESSURE: 91 MMHG | SYSTOLIC BLOOD PRESSURE: 129 MMHG

## 2023-11-01 LAB
ALBUMIN SERPL BCP-MCNC: 4.8 G/DL (ref 3.5–5.2)
ALP SERPL-CCNC: 39 U/L (ref 55–135)
ALT SERPL W/O P-5'-P-CCNC: 24 U/L (ref 10–44)
ANION GAP SERPL CALC-SCNC: 17 MMOL/L (ref 8–16)
AST SERPL-CCNC: 25 U/L (ref 10–40)
BASOPHILS # BLD AUTO: 0.06 K/UL (ref 0–0.2)
BASOPHILS NFR BLD: 0.6 % (ref 0–1.9)
BILIRUB SERPL-MCNC: 1 MG/DL (ref 0.1–1)
BUN SERPL-MCNC: 10 MG/DL (ref 6–20)
CALCIUM SERPL-MCNC: 9.9 MG/DL (ref 8.7–10.5)
CHLORIDE SERPL-SCNC: 104 MMOL/L (ref 95–110)
CO2 SERPL-SCNC: 19 MMOL/L (ref 23–29)
CREAT SERPL-MCNC: 1.4 MG/DL (ref 0.5–1.4)
DIFFERENTIAL METHOD: ABNORMAL
EOSINOPHIL # BLD AUTO: 0 K/UL (ref 0–0.5)
EOSINOPHIL NFR BLD: 0.4 % (ref 0–8)
ERYTHROCYTE [DISTWIDTH] IN BLOOD BY AUTOMATED COUNT: 13 % (ref 11.5–14.5)
EST. GFR  (NO RACE VARIABLE): >60 ML/MIN/1.73 M^2
GLUCOSE SERPL-MCNC: 117 MG/DL (ref 70–110)
HCT VFR BLD AUTO: 45.3 % (ref 40–54)
HGB BLD-MCNC: 16.2 G/DL (ref 14–18)
IMM GRANULOCYTES # BLD AUTO: 0.03 K/UL (ref 0–0.04)
IMM GRANULOCYTES NFR BLD AUTO: 0.3 % (ref 0–0.5)
LYMPHOCYTES # BLD AUTO: 3.3 K/UL (ref 1–4.8)
LYMPHOCYTES NFR BLD: 31.4 % (ref 18–48)
MAGNESIUM SERPL-MCNC: 2.2 MG/DL (ref 1.6–2.6)
MCH RBC QN AUTO: 27.4 PG (ref 27–31)
MCHC RBC AUTO-ENTMCNC: 35.8 G/DL (ref 32–36)
MCV RBC AUTO: 77 FL (ref 82–98)
MONOCYTES # BLD AUTO: 1.1 K/UL (ref 0.3–1)
MONOCYTES NFR BLD: 10.9 % (ref 4–15)
NEUTROPHILS # BLD AUTO: 5.9 K/UL (ref 1.8–7.7)
NEUTROPHILS NFR BLD: 56.4 % (ref 38–73)
NRBC BLD-RTO: 0 /100 WBC
PLATELET # BLD AUTO: 306 K/UL (ref 150–450)
PMV BLD AUTO: 11.1 FL (ref 9.2–12.9)
POTASSIUM SERPL-SCNC: 3.9 MMOL/L (ref 3.5–5.1)
PROT SERPL-MCNC: 8.5 G/DL (ref 6–8.4)
RBC # BLD AUTO: 5.92 M/UL (ref 4.6–6.2)
SODIUM SERPL-SCNC: 140 MMOL/L (ref 136–145)
WBC # BLD AUTO: 10.48 K/UL (ref 3.9–12.7)

## 2023-11-01 PROCEDURE — 99232 PR SUBSEQUENT HOSPITAL CARE,LEVL II: ICD-10-PCS | Mod: ,,, | Performed by: INTERNAL MEDICINE

## 2023-11-01 PROCEDURE — 36415 COLL VENOUS BLD VENIPUNCTURE: CPT | Performed by: NURSE PRACTITIONER

## 2023-11-01 PROCEDURE — 85025 COMPLETE CBC W/AUTO DIFF WBC: CPT | Performed by: NURSE PRACTITIONER

## 2023-11-01 PROCEDURE — 99232 PR SUBSEQUENT HOSPITAL CARE,LEVL II: ICD-10-PCS | Mod: AF,HB,, | Performed by: PSYCHIATRY & NEUROLOGY

## 2023-11-01 PROCEDURE — 99232 SBSQ HOSP IP/OBS MODERATE 35: CPT | Mod: AF,HB,, | Performed by: PSYCHIATRY & NEUROLOGY

## 2023-11-01 PROCEDURE — G0378 HOSPITAL OBSERVATION PER HR: HCPCS

## 2023-11-01 PROCEDURE — 99232 SBSQ HOSP IP/OBS MODERATE 35: CPT | Mod: ,,, | Performed by: INTERNAL MEDICINE

## 2023-11-01 PROCEDURE — 63600175 PHARM REV CODE 636 W HCPCS: Performed by: INTERNAL MEDICINE

## 2023-11-01 PROCEDURE — 83735 ASSAY OF MAGNESIUM: CPT | Performed by: NURSE PRACTITIONER

## 2023-11-01 PROCEDURE — 80053 COMPREHEN METABOLIC PANEL: CPT | Performed by: NURSE PRACTITIONER

## 2023-11-01 PROCEDURE — 96376 TX/PRO/DX INJ SAME DRUG ADON: CPT

## 2023-11-01 RX ORDER — BUPRENORPHINE HYDROCHLORIDE AND NALOXONE HYDROCHLORIDE 8.6; 2.1 MG/1; MG/1
1 TABLET, ORALLY DISINTEGRATING SUBLINGUAL DAILY
Qty: 30 TABLET | Refills: 0 | Status: SHIPPED | OUTPATIENT
Start: 2023-11-01

## 2023-11-01 RX ADMIN — LORAZEPAM 1 MG: 2 INJECTION INTRAMUSCULAR; INTRAVENOUS at 11:11

## 2023-11-01 RX ADMIN — BUPRENORPHINE AND NALOXONE 1 FILM: 8; 2 FILM BUCCAL; SUBLINGUAL at 08:11

## 2023-11-01 NOTE — SUBJECTIVE & OBJECTIVE
Interval History: States he would like to go home. Will discussrescinding PEC and dc to home with psychiatry    Review of Systems  Objective:     Vital Signs (Most Recent):  Temp: 97 °F (36.1 °C) (11/01/23 0759)  Pulse: 60 (11/01/23 0759)  Resp: 16 (11/01/23 0759)  BP: (!) 149/97 (11/01/23 0759)  SpO2: 100 % (11/01/23 0759) Vital Signs (24h Range):  Temp:  [97 °F (36.1 °C)-99.3 °F (37.4 °C)] 97 °F (36.1 °C)  Pulse:  [52-60] 60  Resp:  [16-18] 16  SpO2:  [96 %-100 %] 100 %  BP: (140-180)/() 149/97        There is no height or weight on file to calculate BMI.  No intake or output data in the 24 hours ending 11/01/23 1034      Physical Exam  Vitals and nursing note reviewed.   Constitutional:       General: He is sleeping. He is not in acute distress.     Appearance: He is diaphoretic. He is not toxic-appearing.   HENT:      Head: Normocephalic and atraumatic.      Nose: Nose normal.      Mouth/Throat:      Mouth: Mucous membranes are dry.   Eyes:      Pupils: Pupils are equal, round, and reactive to light.   Cardiovascular:      Rate and Rhythm: Normal rate and regular rhythm.      Pulses: Normal pulses.   Pulmonary:      Effort: Pulmonary effort is normal. No respiratory distress.      Breath sounds: No wheezing, rhonchi or rales.   Abdominal:      General: Bowel sounds are normal. There is no distension.      Palpations: Abdomen is soft.      Tenderness: There is no abdominal tenderness. There is no guarding.   Musculoskeletal:         General: Normal range of motion.      Cervical back: Normal range of motion.   Skin:     General: Skin is warm.      Capillary Refill: Capillary refill takes less than 2 seconds.   Neurological:      General: No focal deficit present.      Mental Status: He is oriented to person, place, and time and easily aroused.      Motor: No weakness, tremor or seizure activity.   Psychiatric:         Attention and Perception: He is inattentive.         Behavior: Behavior is agitated and  withdrawn.             Significant Labs: All pertinent labs within the past 24 hours have been reviewed.  BMP:   Recent Labs   Lab 11/01/23  0524   *      K 3.9      CO2 19*   BUN 10   CREATININE 1.4   CALCIUM 9.9   MG 2.2       Significant Imaging: I have reviewed all pertinent imaging results/findings within the past 24 hours.

## 2023-11-01 NOTE — PLAN OF CARE
Problem: Adult Inpatient Plan of Care  Goal: Plan of Care Review  Outcome: Ongoing, Progressing  Goal: Patient-Specific Goal (Individualized)  Outcome: Ongoing, Progressing  Goal: Absence of Hospital-Acquired Illness or Injury  Outcome: Ongoing, Progressing  Goal: Optimal Comfort and Wellbeing  Outcome: Ongoing, Progressing  Goal: Readiness for Transition of Care  Outcome: Ongoing, Progressing     Problem: Skin Injury Risk Increased  Goal: Skin Health and Integrity  Outcome: Ongoing, Progressing     Problem: Activity and Energy Impairment (Anxiety Signs/Symptoms)  Goal: Optimized Energy Level (Anxiety Signs/Symptoms)  Outcome: Ongoing, Progressing     Problem: Cognitive Impairment (Anxiety Signs/Symptoms)  Goal: Optimized Cognitive Function (Anxiety Signs/Symptoms)  Outcome: Ongoing, Progressing     Problem: Mood Impairment (Anxiety Signs/Symptoms)  Goal: Improved Mood Symptoms (Anxiety Signs/Symptoms)  Outcome: Ongoing, Progressing

## 2023-11-01 NOTE — PROGRESS NOTES
"[facility] Medicine  Progress Note    Patient Name: Lou Cunha  MRN: [id number]  Patient Class: OP- Observation   Admission Date: 10/30/2023  Length of Stay: 0 days  Attending Physician: Goldy Gallego MD  Primary Care Provider: Goldy Gallego MD        Subjective:     Principal Problem:Seizure        HPI:  Lou Cunha is a 38 y.o. male with a PMHx of heroin addiction/opioid use disorder who presents to the ED from [place] for seizure like activity. Patient denies any history of seizures. He was unable to recall what happened early today. I called [facility] and spoke with the house supervisor who stated the patient was okay this morning when the provider rounded. He took a PRN dose of suboxone around 9:30am. She states later in the day she witnessed these episodes of seizure-like activity and did not think they were consistent with seizures as patient was moving different parts of his body at different times and was tracking with his eyes. She reports he had no postictal period, tongue biting, or urinary incontinence. The patient endorses chills, diaphoresis, myalgias, N/V/D, headache, or SOB. The patient reports he last used heroin the day before being admitted to [facility]. Denies any other illicit drug use or ETOH use.     In the ED, patient hypertensive otherwise vitals stable, afebrile. CBC unremarkable. Cr 1.1. Glucose 115. Acetaminophen 8.3. ETOH <10. TSH 0.147 but  free T4 0.90. . Hepatitis C Ab and HIV 1/2 Ag/Ab Non-reactive. Flu/COVID negative. UA and UDS pending. EKG with shows SR, 57 bpm, no acute ischemic changes. CTH allowing for some degree of motion artifact, no convincing acute intracranial abnormalities. CXR with no acute abnormality. The patient received toradol 10mg, tylenol 1g, zofran 4mg, keppra 2g, and ativan 2mg. An additional seizure like episode was witnessed by the ED provider, "Called to bedside the patient had another witnessed seizure lasting " "less than 1 minute. Patient had no incontinence, no tongue biting he had no change in his vital signs throughout the episode. On my arrival were able to immediately wake the patient up and he is not postictal in any way.  Unclear if this was a true seizure versus a nonepileptic psychogenic seizure."      Overview/Hospital Course:  No notes on file    Interval History: States he would like to go home. Will discuss rescinding PEC and dc to home with psychiatry input. Seems calm this am but nursing reports increased agitation    Review of Systems  Objective:     Vital Signs (Most Recent):  Temp: 97 °F (36.1 °C) (11/01/23 0759)  Pulse: 60 (11/01/23 0759)  Resp: 16 (11/01/23 0759)  BP: (!) 149/97 (11/01/23 0759)  SpO2: 100 % (11/01/23 0759) Vital Signs (24h Range):  Temp:  [97 °F (36.1 °C)-99.3 °F (37.4 °C)] 97 °F (36.1 °C)  Pulse:  [52-60] 60  Resp:  [16-18] 16  SpO2:  [96 %-100 %] 100 %  BP: (140-180)/() 149/97        There is no height or weight on file to calculate BMI.  No intake or output data in the 24 hours ending 11/01/23 1034      Physical Exam  Vitals and nursing note reviewed.   Constitutional:       General: He is sleeping. He is not in acute distress.     Appearance: He is diaphoretic. He is not toxic-appearing.   HENT:      Head: Normocephalic and atraumatic.      Nose: Nose normal.      Mouth/Throat:      Mouth: Mucous membranes are dry.   Eyes:      Pupils: Pupils are equal, round, and reactive to light.   Cardiovascular:      Rate and Rhythm: Normal rate and regular rhythm.      Pulses: Normal pulses.   Pulmonary:      Effort: Pulmonary effort is normal. No respiratory distress.      Breath sounds: No wheezing, rhonchi or rales.   Abdominal:      General: Bowel sounds are normal. There is no distension.      Palpations: Abdomen is soft.      Tenderness: There is no abdominal tenderness. There is no guarding.   Musculoskeletal:         General: Normal range of motion.      Cervical back: Normal " range of motion.   Skin:     General: Skin is warm.      Capillary Refill: Capillary refill takes less than 2 seconds.   Neurological:      General: No focal deficit present.      Mental Status: He is oriented to person, place, and time and easily aroused.      Motor: No weakness, tremor or seizure activity.   Psychiatric:         Attention and Perception: He is inattentive.         Behavior: Behavior is agitated and withdrawn.             Significant Labs: All pertinent labs within the past 24 hours have been reviewed.  BMP:   Recent Labs   Lab 11/01/23  0524   *      K 3.9      CO2 19*   BUN 10   CREATININE 1.4   CALCIUM 9.9   MG 2.2       Significant Imaging: I have reviewed all pertinent imaging results/findings within the past 24 hours.      Assessment/Plan:      * Seizure  Patient presents from [facility] for seizure-like activity. Patient denies any history of seizures. He was unable to recall what happened early today. I called [facility] and spoke with the house supervisor who stated the patient was okay this morning when the provider rounded. He took a PRN dose of suboxone around 9:30am. She states later in the day she witnessed these episodes of seizure-like activity and did not think they were consistent with seizures as patient was moving different parts of his body at different times and was tracking with his eyes. She reports he had no postictal period, tongue biting, or urinary incontinence.  -Patient received 2g keppra in the ED and 2mg IV ativan.  -Labs largely unremarkable. .  -UDS +cocaine, fentanyl, THC, and benzos.  -CTH negative for acute process.  -EEG pending.  -Monitor neuro status closely.  -Seizure/fall precautions.     Severe opioid use disorder  Patient presents from [place] where he was admitted for voluntary detox on 10/28. Patient reports he last snorted heroin 10/29 but when asked further he states the day before he went to [facility]. Denies any other  illicit drug use or ETOH use.    -Patient PECd in the ED, continue for now.  -UDS +cocaine, fentanyl, THC, and benzos.  -ETOH <10  -Psychiatry consulted due to opioid withdrawal. Last dose Suboxone 4-2mg at [facility] at 9:38. Will give a dose of Suboxone tonight.  -PRN trazodone, zanaflex, clonidine, imodium, bentyl, and hydroxyzine.  -PRN antiemetics.       VTE Risk Mitigation (From admission, onward)           Ordered     IP VTE LOW RISK PATIENT  Once         10/30/23 2017     Place sequential compression device  Until discontinued         10/30/23 2017                    Discharge Planning   NAOMY: 11/1/2023     Code Status: Full Code   Is the patient medically ready for discharge?: Yes    Reason for patient still in hospital (select all that apply): Patient unstable  Discharge Plan A: Home                  Goldy Gallego MD  Department of Hospital Medicine   [facility] Surg

## 2023-11-01 NOTE — PROGRESS NOTES
"CONSULTATION LIAISON PSYCHIATRY PROGRESS NOTE    Patient Name: Lou Cunha  MRN: [id number]  Patient Class: OP- Observation  Admission Date: 10/30/2023  Attending Physician: Goldy Gallego MD      SUBJECTIVE:   Lou Cunha is a 38 y.o. male with past psychiatric history of opioid use disorder admitted to the hospital for Seizure-like activity after being at [facility] for opioid detox earlier in the day.       Psychiatry consulted for  "opioid withdrawal"    Today, patient seen at bedside. Patient calm and cooperative and states he is ready  to be discharged. He reports he does not plan to go back to [facility] but would like to follow up outpatient to continue his suboxone treatment. Patient denies SI/HI/AVH. When discussing the events of yesterday he reported he was just out of it but is A&OX3 today. Denied any withdrawal symptoms.        OBJECTIVE:    Mental Status Exam:  General Appearance: appears stated age, well developed and nourished, adequately groomed and appropriately dressed, in no acute distress  Behavior: normal; cooperative; reasonably friendly, pleasant, and polite; appropriate eye-contact; under good behavioral control  Involuntary Movements and Motor Activity: no abnormal involuntary movements noted; no tics, no tremors, no akathisia, no dystonia, no evidence of tardive dyskinesia; no psychomotor agitation or retardation  Gait and Station: intact, normal gait and station, ambulates without assistance  Speech and Language: intact; normal rate, rhythm, volume, tone, and pitch; conversational, spontaneous, and coherent; speaks and understands English proficiently and fluently; repeats words and phrases, no word finding difficulties are noted  Mood: "ready to go"  Affect: normal, euthymic, reactive, full-range, mood-congruent, appropriate to situation and context  Thought Process and Associations: intact; linear, goal-directed, organized, and logical; no loosening of associations " noted  Thought Content and Perceptions:: no suicidal or homicidal ideation, no auditory or visual hallucinations, no paranoid ideation, no ideas of reference, no evidence of delusions or psychosis  Sensorium and Orientation: intact; alert with clear sensorium; oriented fully to person, place, time and situation  Recent and Remote Memory: grossly intact, able to recall relevant and salient information from the recent and remote past  Attention and Concentration: grossly intact, attentive to the conversation and not readily distractible  Fund of Knowledge: grossly intact, used appropriate vocabulary and demonstrated an awareness of current events, consistent with educational level achieved  Insight: adequate  Judgment: adequate    CAM ICU positive? no      ASSESSMENT & RECOMMENDATIONS   R/o Schizophrenia Spectrum and Other Psychotic Disorders  R/o Psychotic Disorder due to another Medical Condition  R/o SIPD   Seizure-like Activity- non epileptic   Opioid Use Disorder, Severe      Scheduled Medications              - continue suboxone 8 mg BID              - No further psychiatric medications indicated at this time, pending further neuro evaluation     Opiate Withdrawal Protocol:  -Clonidine 0.1mg PO q4hr PRN for withdrawal associated HTN  -Bentyl 10mg PO q6hr PRN for abdominal muscle cramps  -Loperamide 2mg PO q6hr PRN for diarrhea  -Robaxin 500mg PO q6hr PRN for muscle spasm  -Zofran 4mg ODT q8hr PRN for nausea  -Vistaril 50mg PO qHS PRN for insomnia      RISK ASSESSMENT  Patient DOES NOT meet criteria for PEC     FOLLOW UP  Will need addiction psychiatry follow up and suboxone bridge until appointment date; patient given resources to schedule follow up appointment      DISPOSITION - once medically cleared:   Defer to medical team    Please contact ON CALL psychiatry service (24/7) for any acute issues that may arise.    Dr. Mayda ACOSTA Psychiatry  [facility]  11/1/2023 10:43  AM        --------------------------------------------------------------------------------------------------------------------------------------------------------------------------------------------------------------------------------------    CONTINUED OBJECTIVE clinical data & findings reviewed and noted for above decision making    Current Medications:   Scheduled Meds:    buprenorphine-naloxone 8-2 mg  1 Film Sublingual BID     PRN Meds: acetaminophen, acetaminophen, cloNIDine, dextrose 10%, dextrose 10%, dicyclomine, glucagon (human recombinant), glucose, glucose, hydrOXYzine HCL, loperamide, lorazepam, melatonin, naloxone, ondansetron, promethazine, sodium chloride 0.9%, sodium chloride 0.9%, tiZANidine, traZODone    Allergies:   Review of patient's allergies indicates:  Not on File    Vitals  Vitals:    11/01/23 0759   BP: (!) 149/97   Pulse: 60   Resp: 16   Temp: 97 °F (36.1 °C)       Labs/Imaging/Studies:  Recent Results (from the past 24 hour(s))   Comprehensive Metabolic Panel (CMP)    Collection Time: 11/01/23  5:24 AM   Result Value Ref Range    Sodium 140 136 - 145 mmol/L    Potassium 3.9 3.5 - 5.1 mmol/L    Chloride 104 95 - 110 mmol/L    CO2 19 (L) 23 - 29 mmol/L    Glucose 117 (H) 70 - 110 mg/dL    BUN 10 6 - 20 mg/dL    Creatinine 1.4 0.5 - 1.4 mg/dL    Calcium 9.9 8.7 - 10.5 mg/dL    Total Protein 8.5 (H) 6.0 - 8.4 g/dL    Albumin 4.8 3.5 - 5.2 g/dL    Total Bilirubin 1.0 0.1 - 1.0 mg/dL    Alkaline Phosphatase 39 (L) 55 - 135 U/L    AST 25 10 - 40 U/L    ALT 24 10 - 44 U/L    eGFR >60.0 >60 mL/min/1.73 m^2    Anion Gap 17 (H) 8 - 16 mmol/L   Magnesium    Collection Time: 11/01/23  5:24 AM   Result Value Ref Range    Magnesium 2.2 1.6 - 2.6 mg/dL   CBC with Automated Differential    Collection Time: 11/01/23  5:24 AM   Result Value Ref Range    WBC 10.48 3.90 - 12.70 K/uL    RBC 5.92 4.60 - 6.20 M/uL    Hemoglobin 16.2 14.0 - 18.0 g/dL    Hematocrit 45.3 40.0 - 54.0 %    MCV 77 (L) 82 - 98 fL     MCH 27.4 27.0 - 31.0 pg    MCHC 35.8 32.0 - 36.0 g/dL    RDW 13.0 11.5 - 14.5 %    Platelets 306 150 - 450 K/uL    MPV 11.1 9.2 - 12.9 fL    Immature Granulocytes 0.3 0.0 - 0.5 %    Gran # (ANC) 5.9 1.8 - 7.7 K/uL    Immature Grans (Abs) 0.03 0.00 - 0.04 K/uL    Lymph # 3.3 1.0 - 4.8 K/uL    Mono # 1.1 (H) 0.3 - 1.0 K/uL    Eos # 0.0 0.0 - 0.5 K/uL    Baso # 0.06 0.00 - 0.20 K/uL    nRBC 0 0 /100 WBC    Gran % 56.4 38.0 - 73.0 %    Lymph % 31.4 18.0 - 48.0 %    Mono % 10.9 4.0 - 15.0 %    Eosinophil % 0.4 0.0 - 8.0 %    Basophil % 0.6 0.0 - 1.9 %    Differential Method Automated      Imaging Results              CT Head Without Contrast (Final result)  Result time 10/30/23 18:45:05      Final result by Urban Dent MD (10/30/23 18:45:05)                   Impression:      1. Allowing for some degree of motion artifact, no convincing acute intracranial abnormalities.      Electronically signed by: Urban Dent MD  Date:    10/30/2023  Time:    18:45               Narrative:    EXAMINATION:  CT HEAD WITHOUT CONTRAST    CLINICAL HISTORY:  Seizure, new-onset, no history of trauma;    TECHNIQUE:  Low dose axial images were obtained through the head.  Coronal and sagittal reformations were also performed. Contrast was not administered.    COMPARISON:  None.    FINDINGS:  There is motion artifact.    There is no evidence of acute major vascular territory infarct, hemorrhage, or mass.  There is no hydrocephalus.  There are no abnormal extra-axial fluid collections.  The paranasal sinuses and mastoid air cells are clear, and there is no evidence of calvarial fracture.  The visualized soft tissues are unremarkable.                                       X-Ray Chest AP Portable (Final result)  Result time 10/30/23 16:58:06      Final result by Sebastian Armstrong MD (10/30/23 16:58:06)                   Impression:      No acute abnormality.      Electronically signed by: Sebastian Armstrong,  MD  Date:    10/30/2023  Time:    16:58               Narrative:    EXAMINATION:  XR CHEST AP PORTABLE    CLINICAL HISTORY:  seizure;    TECHNIQUE:  Single frontal view of the chest was performed.    COMPARISON:  None    FINDINGS:  Lungs are clear.  No effusion or pneumothorax.    No acute bone abnormality.

## 2023-11-01 NOTE — PLAN OF CARE
[facility] - Med Surg  Discharge Final Note    Primary Care Provider: Goldy Gallego MD    Expected Discharge Date: 11/1/2023      Patient discharged to home. Picked up by a friend. No needs.    Discharge Plan A and Plan B have been determined by review of patient's clinical status, future medical and therapeutic needs, and coverage/benefits for post-acute care in coordination with multidisciplinary team members.    Final Discharge Note (most recent)       Final Note - 11/01/23 4712          Final Note    Assessment Type Final Discharge Note (P)      Anticipated Discharge Disposition Home or Self Care (P)      Hospital Resources/Appts/Education Provided Appointments scheduled and added to AVS (P)         Post-Acute Status    Discharge Delays None known at this time (P)                      Important Message from Medicare Shantrell Pendleton, RNCM  Case Management  [phone]

## 2023-11-01 NOTE — PLAN OF CARE
Problem: Adult Inpatient Plan of Care  Goal: Absence of Hospital-Acquired Illness or Injury  11/1/2023 0157 by Tobin Lynn RN  Outcome: Ongoing, Progressing  11/1/2023 0156 by Tobin Lynn RN  Outcome: Ongoing, Progressing  Goal: Optimal Comfort and Wellbeing  11/1/2023 0157 by Tobin Lynn RN  Outcome: Ongoing, Progressing  11/1/2023 0156 by Tobin Lynn RN  Outcome: Ongoing, Progressing     Problem: Skin Injury Risk Increased  Goal: Skin Health and Integrity  11/1/2023 0157 by Tobin Lynn RN  Outcome: Ongoing, Progressing  11/1/2023 0156 by Tobin Lynn RN  Outcome: Ongoing, Progressing     Problem: Activity and Energy Impairment (Anxiety Signs/Symptoms)  Goal: Optimized Energy Level (Anxiety Signs/Symptoms)  Outcome: Ongoing, Progressing     Problem: Cognitive Impairment (Anxiety Signs/Symptoms)  Goal: Optimized Cognitive Function (Anxiety Signs/Symptoms)  Outcome: Ongoing, Progressing

## 2023-11-01 NOTE — DISCHARGE INSTRUCTIONS
REFERRAL RECOMMENDATIONS FOR SUBSTANCE ABUSE & MENTAL HEALTH      IN CASE OF SUICIDAL THINKING, call the National Suicide Hotline Number: 988    988 Suicide & Crisis Lifeline: 989 , 3-921-706-PDBI (9267)  https://988"[employer]".[employer]       SUBSTANCE ABUSE:     [facility] (formerly known as the ABU)  [x] [phone], Option 2  [x] [address] [facility] [facility] 4[facility], [place] [address]  [x] [email or web address]  [x] The [facility] delivers comprehensive and collaborative treatment for alcohol and substance use disorders.  Excellent program for working professionals or anyone else seeking recovery.  [x] Requires insurance approval prior to starting program, call number above for more information.  [x] Intensive Outpatient Rehabilitation Program - M-F 9am-3pm - daily groups with psychologists and social workers, sessions with MDs 3x per week   [x] Ambulatory detox and dual diagnosis available      SUBOXONE:     NOTE: some Suboxone clinics require their clients to participate in a structured program (such as an IOP) in order to be prescribed Suboxone.  Some clinics have a long waiting list.  Most of these clinics do not accept walk-in clients, so call first to to learn what must be done to get started on Suboxone.    [facility] - [phone] (can do Sublocade)  [address] [facility], [place] [address]    [address]  [phone]    [facility] - [phone] (can do Sublocade)  [address] [place] [address]    [facility]  [address] [place]  [phone]     [facility] (very short waiting list, may accept some walk-in's but call first if possible)  [address], [place] [address]  [phone]; [phone]    [facility]   [address] [place]    [phone]    [facility] (can usually be seen within a week but is cash only  for appointment)  [address] [place], [place]    [facility] ([facility])  [address] [facility], [address] [place] [id number] [phone]    [facility] ([facility])  [address], [place]  [phone]    [facility] ([facility])  [address] [facility] [address]  [phone]    [place], [place]:    [facility] - [address] [place] [place] [address] - Tel: [phone]    Boni Abel - [address] [place] [address] - Tel: [phone]    Roney Montgomery - [address] [place] [address] - Tel: [phone]    Rodrigo Adan - [address] [place] [address] - Tel: [phone]    Willam Kaur - [address] [place] [address] - Tel: [phone]    [place], [place]:     Dr. Dustin Alexander and Dr. Onur Aguirre - [address], [place] - Tel: [phone]    Dr. Nicolasa Stewart - [address], [place] - Tel: [phone]    Dr. Dickson Capone - Tel: [phone]    Dr. Christian Malhotra - [facility] - [phone]      METHADONE:     [facility] (the only methadone clinic in the [facility], has two locations)  [x] [place] - [facility] [place] [address], [phone]  [x] [facility] [place] [address], [phone]    12 STEP PROGRAMS (and similar):     Alcoholics Anonymous (local)  [x] [phone]  [x] www.[place].org for schedules for in-person and online meetings  [x] There are AA meetings throughout the day all over town  [x] AA costs nothing to attend; they pass a basket for donations but this is not required    Narcotics Anonymous  [x] [phone]  [x] www.noana.org  [x] There are NA meetings throughout the day all over town  [x] NA costs nothing to attend; they pass a basket for donations but this is not required    Alcoholics Anonymous Online Intergroup (national)  [x] www.aa-intergroup.org  [x] Good resource for large, nation-wide meetings  [x] Can also attend smaller, local meetings in other cities  [x] Countless meetings  all day and all night  [x] AA costs nothing to attend; they pass a basket for donations but this is not required    Flying Sober - 24/7 zoom meetings for women and coed - sign on anytime, anywhere!  [email or web address] [email or web address]-[email or web address]    Online Intergroup of AA - 121 Open AA [place] Meeting - 24/7 zoom meetings  https://aa-intergroup.org/meetings/    LOOKING FOR AN ALTERNATIVE TO 12 STEP PROGRAMS - check out:  SMART Recovery: https://www.smartrecovery.org/about-us  [facility]: https://recoverydharma.org      DETOX UNITS (USUALLY 5-7 DAYS):     [facility]: [address], [place]  [phone], call first to ensure bed availability    [facility]: [address] [place]  [phone], Option 1, call first to ensure bed availability    [facility]: [facility] [place], [facility]  [phone] (intake by appointment only)    [facility]: [address], [place]  [phone]      INTENSIVE OUTPATIENT PROGRAMS:     [facility] (formerly known as the ABU)  [x] [phone], Option 2  [x] [address] [facility] 4[facility], [facility] [address]  [x] [email or web address]  [x] The [facility] Program delivers comprehensive and collaborative treatment for alcohol and substance use disorders.  Excellent program for working professionals or anyone else seeking recovery.  [x] Requires insurance approval prior to starting program, call number above for more information.  [x] Intensive Outpatient Rehabilitation Program - M-F 9am-3pm - daily groups with psychologists and social workers, sessions with MDs 3x per week   [x] Ambulatory detox and dual diagnosis available    [facility] Int[facility] Out[facility]  [x] [phone]  [x] [address] [facility] (the clinic not on [facility])  [x] Call number above for more info and to check insurance requirements    [address] [place] [address]  [phone]    [facility]:  [address] [facility], [address]?, [place], [place] [address]?, [phone]  [address]?, [place], [place] [address]?, [phone]    RESIDENTIAL REHABS (USUALLY 28 DAYS):     [facility]: [address], [phone]    [place] [facility]: [address] [place]  [phone] (intake by appointment only)    [facility] (men only) [address], [phone]    [facility] (Female only) [address], [phone]    [facility]: [address], [place], men's program [phone], women's program [phone]    [facility]: [address] [place], [phone]    [facility]: [address], [place], [place], [phone]    [facility]: Men only, [phone], [address] [facility] Curt Mendez [facility]: [address], [place], [place], [phone]    [facility]: [address] [place] [address],  [phone]  New Location: [address] [place] [address] [address], [place] [address], [phone]    [facility]:   ?[address]?[place], [place] [address]?[phone]    [place]: [address] [place] [address], [place] [address], [phone]    [place]: [place], [phone]     [facility]: [place], [place], [phone]    [facility]: [place], [phone]    [facility]: [place], [place], [phone]    [place]: [address], [phone]    [facility]: [address], [place] [address], [phone]    COMMUNITY ADDICTION CLINICS:     [facility]: [address] [facility], [address] [place] -[phone] -or- [address] [place] [address]    [facility]: [address], [place]  [address]     [facility]: Clinics [phone]; Crisis [phone]    [facility]: [address] [place] [address]    [facility]: [address] [place] [address]    [facility]: [address] [place] [address],    [facility]: 2nd Floor [address] [facility] [facility] [place] [address]    [facility]: [address], [place] [address]    [facility], [address] [place] [address]    [facility]: [address] [facility] [place], [place] [phone]  (serves youth 16-23 years old)    [facility]: [facility]/Dorothy/[place]/[facility] [phone]    [facility]: [address] [facility], [place] [phone]    [facility]: [address] [facility] [phone]    [facility]: [address] [facility] [facility], [address], [phone]     [facility]: [address] [facility], [phone], [phone]    RESOURCES IN OTHER [facility]:     [facility]: [address] [facility], [phone], [phone]    [facility]: [address] [facility], [address], [phone]    [facility], [address] [place] [address], [phone]    [facility]: [address] [facility], [phone]    [facility], [address] [facility], [phone] ([facility])    [facility], [address] [facility], [phone] ([facility] [place])    [facility], [address], [place], [phone] ([facility], and [facility])    [facility], [address] [place], [phone] ([facility])    [facility] Hotline [phone], [phone]    [facility], [address] [facility], [facility], [address] [place] [address]  "Blvd[address], [facility], [address], [facility], [address] [place] [facility], [address], [place]    [facility], [address], [place] [phone]    [facility], [address] [facility] [phone]    [facility] Recovery, [address] [facility] [phone]    [facility], [address] [place], [phone]      Beninese SPEAKING (en español):     Información de la reunión de Alcohólicos Anónimos  [facility], 10:00 am  [facility]abla [facility]  Esta reunión está abierta y cualquiera puede asistir.    Burkinan speaking Alcoholics anonymous meetings:  El "Chris [place] AA Skype" es un chris on line de Alcohólicos Anónimos en [facility]. El chris es chandler, gratuito y virtual a través de Skype Audio. El chris funciona mediante savi llamada grupal de voz, por lo que no se utiliza la videollamada, ni se pueden ramon las imágenes o rostros de los participantes. Hace donell años y medio abrimos el primer Chris de AA por Skype en [facility], connor actualmente asisten personas desde [place], [place], [place], [place], [place], [place], [place], [place], [place], [place], [place] y [place], entre otros.    El chris es muy útil para los alcohólicos que residen en lugares donde no se celebran reuniones de AA, o residen en lugares donde las reuniones de AA son un número limitado de días a la semana, o para aquellos compañeros que se hayan de viaje o que, por cualquier motivo, se hayan convalecientes y no pueden desplazarse. Todos los días nos reuniones a las 21:00 (hora española)    Podéis obtener más información sobre el chris y tania sesiones en la página web [email or web address]      MENTAL HEALTH:     [facility]  Department of Psychiatry - " Outpatient Clinic  [phone]    [facility] of Psychiatry - General Psychiatry Intensive Outpatient Program  [facility] (formerly known as the BMU)  [phone], option 3    [facility] Psych[facility] - Dual Diagnosis Intensive Outpatient Program  [facility] (formerly known as the ABU)  [phone], option 2      [facility] MENTAL HEALTH CENTERS:     [facility]  ([facility])  Serves [facility].  Serves uninsured patients & those with Medicaid.  Main location: [address] [place] [id number] [phone]  Walk-in's available during regular business hours.  24/7 Crisis Line: [phone]    [facility]  (aka [facility], [facility] [place] [facility])  Serves [facility].  Serves uninsured patients, those with Medicaid and some private plans.  Walk-in's available during regular business hours.  Primary care services available as well.  [facility]: [address] [facility] [place] [address];  [phone]  [place]: [address] [place] [address];  [phone]  24/7 Crisis Line: [phone]    [facility]  Serves uninsured patients & those with Medicaid, call for more info.  Primary care, pediatrics, HIV treatment, and dentistry services available as well.  Three locations.  [phone]    [facility] [place]Corporate Office  Serves patients with Medicaid, Medicare, and private insurance  [address] [place] [address]  [phone]    [facility]  Serves uninsured on a sliding scale, as well as Medicaid, Medicare, and private plans.  Eight locations around the [facility] area.  [phone]    [facility]  Serves uninsured patients & those with Medicaid, private insurances.  Primary care  available as well.  [phone]  [address] [place] [address]    [facility] Outpatient Psychiatry  Serves veterans who were honorably discharged.  [address] [place] [address]  [phone]  24/7 Veterans Crisis Line: [phone] (Press 1)    If you have private insurance and need to find a specialist, please contact your insurance network to request a list of providers covered by your benefits.      MENTAL HEALTH/ADDICTIVE DISORDERS:     AA ([phone]), NA ([phone])   National Suicide Prevention Lifeline- Call 1-638.243.3441 Available 24 hours everyday  [facility] 925-7581; Crisis Line 748-0100 - Call for options A-F:  Intensive Outpatient Treatment/ Day programs   [facility], please contact   [facility], please contact [phone] or [phone] to speak with an admissions counselor.  [facility] (Methadone Maintenance)   [address] [place] [address], [phone]  [address] [place] [address] [phone]  [facility], [address] [place] [address], [phone]  [place] [facility] [place] [facility] [phone]  [facility] please contact [phone]  [facility], [address] [facility], 4th floor [place] [address] Phone: [phone]   Britney Fuentes Office: [address] [address], [phone]  [place] Office: [address] [facility], [address], [place] [address], [phone]  [place] Office: [address] [place] [address] [phone]    Outpatient Substance Abuse Treatment   [facility] (Methadone Maintenance)   [address] [place] [address], [phone]  [address] [place] [address] [phone]  [facility], [address] [place] [address], [phone]  [facility]  [place] Office: [address], [place] [address], [phone]  [place]  Office: [address] [facility], [address], [place] [address], [phone]  [place] Office: [address] [facility] [place], [place] [address] [phone]  [facility], [address] [place] [address] [phone]   [facility], [address] [facility], [address], [phone]  [facility] Addiction Recover, [address] [place], [place] [phone]    Residential Substance Abuse Treatment   [facility] [address] [facility], [phone]  [facility], [address] [facility], [phone]  [facility] (men only) [address] [place] [address], [phone]  [facility] (women only) [address] [place] [address] [phone]  [facility], [address] [place] [address] [phone]  [facility] (women only), intakes at [address] [facility], [phone]  [facility] (7-day program, $100, [address], [phone], [phone], [phone])  [facility] (Men only, [phone]), [address], Curt (Vets*/Non-Vets)  [facility] (Men only, $400/month program fee) [address] [facility], [phone]  [facility] [place] (Women over age 39 only), [address], [phone]    Out of Area:    [place], [address] [facility] [address], [place], [place] [phone])  [facility] (men only), [address] [facility] [place] [address], [phone]  [facility], [address] [place], [place] ([phone])  [place], [address] [place] [address] Dr. Kelley, [place] ([phone])  [facility], [address] [place] [address], [phone]  [facility] (Women only, has to have mental illness, can be homeless or substance abuser), [phone]        DOMESTIC VIOLENCE RESOURCES:     Advocacy  [facility] ([facility])  [address] [address]    [facility] ? [phone]  Services  provided: emergency shelter, individual advocacy, information and referrals, group support, children's program, medical advocacy, forensic medical exams, primary care, legal assistance, counseling, safety planning, and caregiver support    [facility] HEALING AND EMPOWERMENT [place]  Confidential location  [facility] ? [phone]  Services provided: short term emergency shelter, all services provided are free of charge    [facility] FOR COMMUNITY ADVOCACY  Multiple locations in [facility], [facility], [place], and [facility] ([place], [place], and [place])    [facility] ? [phone]  Services provided: emergency shelter, individual advocacy, information and referrals, group support, children's program, medical advocacy, legal assistance in obtaining restraining orders, counseling, safety planning, and caregiver support    [facility]   Emergency Shelter   [phone]  Emergency Services ,Legal and Financial Assistance Services ,Housing Services ,Support Services     [facility]   [phone]  Emergency Services ,Counseling Services , Housing Services ,Support Services ,Children's Services     [facility]OM[facility]ISION  [address] [place] [address]  WWAV ? [phone]  Services provided: advocacy, health education and supportive services, specializing in free healing services for marginalized groups, including LGBTQ individuals and sex workers    SEXUAL TRAUMA AWARENESS AND RESPONSE (STAR)  [address] [place] [address]    STAR ? (959) 042-IUKI  Services provided: individual advocacy, information and referrals, group support, medical advocacy, legal assistance, counseling, and safety planning for survivors of sexual assault    [facility] ([facility])  [address] [place] [address]  [facility] Forensic Program ? [phone]  Services provided: free forensic medical exams for sexual assault and  domestic violence, which can be performed up to 5 days after an incident. It is not necessary to make a police report to receive a forensic medical exam    Legal  [facility]  [address], [place] [address]  [facility] ? [phone]  Services provided: free emergency legal representation for survivors of doemstic violence residing in [facility]. Legal services may include temporary restraining orders, temporary child support, custody, and use of property    [facility] LEGAL SERVICES (SLLS)  [address], [place] [address]  SLLS ? [phone]  Services provided: free legal representation for survivors of domestic violence residing in [facility]. Legal services may include temporary child support, custody, and divorce      HOTLINES:     [facility] DOMESTIC VIOLENCE HOTLINE  [phone]    Services provided: free and confidential hotline for victims and survivors of domestic violence. All calls will be routed to a domestic violence service provided in the victim or survivor's area    NATIONAL HUMAN TRAFFICKING HOTLINE  (322) 124-1116    Services provided: national anti-trafficking hotline serving victims and survivors of human trafficking. Provides information about local resources, and access to safe space to report tips, seek services, and ask for help    VIA LINK  211 or (093) [phone]    Service provided: counselors can provide crisis counseling. Counselors can also provide information and referrals to programs which can help with needs such as food, shelter, medical care, financial assistance, mental health services, substance abuse treatment, senior services, childcare, and more      HOMELESS SHELTERS:      Homeless shelters  [facility]  Emergency shelter for individuals and families  [address]  [phone]  [facility]  Emergency shelter for men only  Meals daily 6am, 2pm, & 6pm  Clothing, case management M-F by appointment  (ID/job/housing/legal assistance), mail  [address] [facility]  [phone]  [place]  Emergency shelter for men  [address] [facility]  [phone]  Emergency shelter for women  [address] [facility]  [phone]  Breakfast & lunch daily, dinner M-F  Case management, job counseling services   [facility]  Emergency shelter for teens and young adults up to 20yo  [address]  [phone]  [facility]  Emergency shelter for women over 17yo and their kids  [address] [place] [address]  [phone]  [facility]  Day program, meals M-F 1PM (arrive early)  Showers, laundry, hygiene kits, showers, phones, , notary services, case management, ID assistance  [address] [facility]  [phone] M-F 8am-2:30pm  [employer]  Day program  [facility] 7:30am-3:30pm,  8:30am-3:30pm  Crisis intervention, employment assistance, food/clothing, hygiene kits, bus tokens, mail  [address] [facility] [address]  [phone]  [facility]  Mobile outreach for homeless persons in [facility]  [phone]  [facility]  Primary healthcare, case management, dental services, TB placement  Call ahead  [address] [facility] 2nd Floor  [phone]  [facility]  Connects homeless people with their loved ones in other cities by providing transportation costs   [phone]      [place] RESOURCES:     [place] Mobile Mental Health Crisis Response Team:    Region 12 ([place], [place], [place], and [facility]) ([facility])  [phone]      Outpatient Mental Health & Addiction Clinic Resources for both [facility]:    [facility] Mental Healthcare Resources  Website: www.pbmhr.org  Main Number: 140-733-2340    [facility] ([facility])  [address] ([address] [facility]) [place] [address]  [phone]    [facility] ([place]  Area)  [address] ([address] [facility]) [place] [address]  [phone]    [facility]  [address] ([address]) [place] [address]  [phone]    [facility]  [address] ([address] [facility]) [place] [address]  [phone]      Addiction Treatment Resources for both [facility] and [place]:    [facility] (Detox, Residential, PHP, IOP, and Aftercare Programs)  [address] [place] [address]  [phone]    [facility] (Residential, IOP, Transitional Living, and Aftercare Programs)  [address] [place] [address]  [phone]    [facility] (Inpatient, Residential, Detox, IOP, Outpatient, and Aftercare Programs)  [address] [place] [address]  [phone] or toll free at [phone]      Outpatient Mental Health Psychotherapy Resources for both [facility] and [place]:    Hiwot Erickson, [facility]  [address]  [place] [address]  [phone]  Specialties: Depression, Anxiety, and Life Transitions    Dayanara Oneill, PhD  [address]  [place] [address]  [phone]  Specialties: Testing and Evaluation, Education and Learning Disabilities, and ADHD    Annette Tripathi, [facility] [address] [place] [address]  [phone]  Specialties: Obsessive-Compulsive (OCD), Depression, and Relationship Issues    Edilia Silva LPC [address] [place] [address]  [phone]  Specialties: Trauma & PTSD, Mood Disorders, and Anxiety    Edilia Lutz, PhD, [facility] [address] [place] [address]  [phone]  Specialties: Family Conflict, Child, and Relationship Issues    Kendra Dowling LPC [facility] [place] [address] [phone]  Specialties: Anxiety, Depression, and  Anger Management        IN CASE OF SUICIDAL THINKING, call the National Suicide Hotline Number: 988    988 Suicide & Crisis Lifeline: 988 , 9-6484-878-322-TALK (5725)  Provides 24/7, free and confidential support for people in distress, prevention and crisis resources for you or your loved ones, and best practices for professionals.    Call, text or chat.  https://988YellowBrck.org

## 2023-11-01 NOTE — PLAN OF CARE
Problem: Adult Inpatient Plan of Care  Goal: Plan of Care Review  11/1/2023 1551 by Zoe Mccall RN  Outcome: Adequate for Care Transition  11/1/2023 1019 by Zoe Mccall RN  Outcome: Ongoing, Progressing  Goal: Patient-Specific Goal (Individualized)  11/1/2023 1551 by Zoe Mccall RN  Outcome: Adequate for Care Transition  11/1/2023 1019 by Zoe Mccall RN  Outcome: Ongoing, Progressing  Goal: Absence of Hospital-Acquired Illness or Injury  11/1/2023 1551 by Zoe Mccall RN  Outcome: Adequate for Care Transition  11/1/2023 1019 by Zoe Mccall RN  Outcome: Ongoing, Progressing  Goal: Optimal Comfort and Wellbeing  11/1/2023 1551 by Zoe Mccall RN  Outcome: Adequate for Care Transition  11/1/2023 1019 by Zoe Mccall RN  Outcome: Ongoing, Progressing  Goal: Readiness for Transition of Care  11/1/2023 1551 by Zoe Mccall RN  Outcome: Adequate for Care Transition  11/1/2023 1019 by Zoe Mccall RN  Outcome: Ongoing, Progressing     Problem: Skin Injury Risk Increased  Goal: Skin Health and Integrity  11/1/2023 1551 by Zoe Mccall RN  Outcome: Adequate for Care Transition  11/1/2023 1019 by Zoe Mccall RN  Outcome: Ongoing, Progressing     Problem: Activity and Energy Impairment (Anxiety Signs/Symptoms)  Goal: Optimized Energy Level (Anxiety Signs/Symptoms)  11/1/2023 1551 by Zoe Mccall RN  Outcome: Adequate for Care Transition  11/1/2023 1019 by Zoe Mccall RN  Outcome: Ongoing, Progressing     Problem: Cognitive Impairment (Anxiety Signs/Symptoms)  Goal: Optimized Cognitive Function (Anxiety Signs/Symptoms)  11/1/2023 1551 by Zoe Mccall RN  Outcome: Adequate for Care Transition  11/1/2023 1019 by Zoe Mccall RN  Outcome: Ongoing, Progressing     Problem: Mood Impairment (Anxiety Signs/Symptoms)  Goal: Improved Mood Symptoms (Anxiety Signs/Symptoms)  11/1/2023 1551 by Zoe Mccall RN  Outcome: Adequate for Care Transition  11/1/2023 1019 by Zoe Mccall,  RN  Outcome: Ongoing, Progressing

## 2023-11-06 NOTE — DISCHARGE SUMMARY
[facility] Medicine  Discharge Summary      Patient Name: Lou Cunha  MRN: [id number]  [facility]: [id number]  Patient Class: OP- Observation  Admission Date: 10/30/2023  Hospital Length of Stay: 0 days  Discharge Date and Time: 11/1/2023  4:40 PM  Attending Physician: No att. providers found   Discharging Provider: Goldy Gallego MD  Primary Care Provider: Goldy Gallego MD  [facility] Medicine Team: [facility] MED R Goldy Gallego MD  Primary Care Team: [facility] MED R    HPI:   Lou Cunha is a 38 y.o. male with a PMHx of heroin addiction/opioid use disorder who presents to the ED from [place] for seizure like activity. Patient denies any history of seizures. He was unable to recall what happened early today. I called [facility] and spoke with the house supervisor who stated the patient was okay this morning when the provider rounded. He took a PRN dose of suboxone around 9:30am. She states later in the day she witnessed these episodes of seizure-like activity and did not think they were consistent with seizures as patient was moving different parts of his body at different times and was tracking with his eyes. She reports he had no postictal period, tongue biting, or urinary incontinence. The patient endorses chills, diaphoresis, myalgias, N/V/D, headache, or SOB. The patient reports he last used heroin the day before being admitted to [place]. Denies any other illicit drug use or ETOH use.     In the ED, patient hypertensive otherwise vitals stable, afebrile. CBC unremarkable. Cr 1.1. Glucose 115. Acetaminophen 8.3. ETOH <10. TSH 0.147 but  free T4 0.90. . Hepatitis C Ab and HIV 1/2 Ag/Ab Non-reactive. Flu/COVID negative. UA and UDS pending. EKG with shows SR, 57 bpm, no acute ischemic changes. CTH allowing for some degree of motion artifact, no convincing acute intracranial abnormalities. CXR with no acute abnormality. The patient received toradol 10mg, tylenol 1g, zofran 4mg,  "keppra 2g, and ativan 2mg. An additional seizure like episode was witnessed by the ED provider, "Called to bedside the patient had another witnessed seizure lasting less than 1 minute. Patient had no incontinence, no tongue biting he had no change in his vital signs throughout the episode. On my arrival were able to immediately wake the patient up and he is not postictal in any way.  Unclear if this was a true seizure versus a nonepileptic psychogenic seizure."      * No surgery found *      Hospital Course:   Lou Cunha is a 38 y.o. male with a PMHx of heroin addiction/opioid use disorder who presents to the ED from [place] for seizure like activity. Patient denies any history of seizures. He was unable to recall what happened early today. I called [facility] and spoke with the house supervisor who stated the patient was okay this morning when the provider rounded. He took a PRN dose of suboxone around 9:30am. She states later in the day she witnessed these episodes of seizure-like activity and did not think they were consistent with seizures as patient was moving different parts of his body at different times and was tracking with his eyes. She reports he had no postictal period, tongue biting, or urinary incontinence. The patient endorses chills, diaphoresis, myalgias, N/V/D, headache, or SOB. The patient reports he last used heroin the day before being admitted to [place]. Denies any other illicit drug use or ETOH use.      In the ED, patient hypertensive otherwise vitals stable, afebrile. CBC unremarkable. Cr 1.1. Glucose 115. Acetaminophen 8.3. ETOH <10. TSH 0.147 but  free T4 0.90. . Hepatitis C Ab and HIV 1/2 Ag/Ab Non-reactive. Flu/COVID negative. UA and UDS pending. EKG with shows SR, 57 bpm, no acute ischemic changes. CTH allowing for some degree of motion artifact, no convincing acute intracranial abnormalities. CXR with no acute abnormality. The patient received toradol 10mg, tylenol " "1g, zofran 4mg, keppra 2g, and ativan 2mg. An additional seizure like episode was witnessed by the ED provider, "Called to bedside the patient had another witnessed seizure lasting less than 1 minute. Patient had no incontinence, no tongue biting he had no change in his vital signs throughout the episode. On my arrival were able to immediately wake the patient up and he is not postictal in any way.  Unclear if this was a true seizure versus a nonepileptic psychogenic seizure."    He did not desire to to go back to [place]. Acute withdrawal symptoms resolved. Was cleared by psychiatry for dc to home on suboxone.,       Goals of Care Treatment Preferences:  Code Status: Full Code      Consults:   Consults (From admission, onward)        Status Ordering Provider     Inpatient consult to Psychiatry  Once        Provider:  (Not yet assigned)    Completed SUSAN FERRER     Inpatient consult to Neurology  Once        Provider:  (Not yet assigned)    Completed SUSAN FERRER     Inpatient consult to Psychiatry  Once        Provider:  (Not yet assigned)    Completed SANG MADRIGAL          No new Assessment & Plan notes have been filed under this hospital service since the last note was generated.  Service: Hospital Medicine    Final Active Diagnoses:    Diagnosis Date Noted POA    PRINCIPAL PROBLEM:  Seizure [R56.9] 10/30/2023 Yes    Severe opioid use disorder [F11.20] 10/30/2023 Yes      Problems Resolved During this Admission:       Discharged Condition: good    Disposition: Home or Self Care    Follow Up:    Patient Instructions:   No discharge procedures on file.    Significant Diagnostic Studies: Labs: BMP: No results for input(s): "GLU", "NA", "K", "CL", "CO2", "BUN", "CREATININE", "CALCIUM", "MG" in the last 48 hours.    Pending Diagnostic Studies:     None         Medications:  Reconciled Home Medications:      Medication List      START taking these medications    ZUBSOLV 8.6-2.1 mg Subl  Generic " drug: buprenorphine-naloxone  Place 1 tablet under the tongue once daily.            Indwelling Lines/Drains at time of discharge:   Lines/Drains/Airways     None                 Time spent on the discharge of patient: 15 minutes         Goldy Gallego MD  Department of [facility] Medicine  [facility]

## 2023-11-06 NOTE — HOSPITAL COURSE
"Lou Cunha is a 38 y.o. male with a PMHx of heroin addiction/opioid use disorder who presents to the ED from [place] for seizure like activity. Patient denies any history of seizures. He was unable to recall what happened early today. I called [facility] and spoke with the house supervisor who stated the patient was okay this morning when the provider rounded. He took a PRN dose of suboxone around 9:30am. She states later in the day she witnessed these episodes of seizure-like activity and did not think they were consistent with seizures as patient was moving different parts of his body at different times and was tracking with his eyes. She reports he had no postictal period, tongue biting, or urinary incontinence. The patient endorses chills, diaphoresis, myalgias, N/V/D, headache, or SOB. The patient reports he last used heroin the day before being admitted to [place]. Denies any other illicit drug use or ETOH use.      In the ED, patient hypertensive otherwise vitals stable, afebrile. CBC unremarkable. Cr 1.1. Glucose 115. Acetaminophen 8.3. ETOH <10. TSH 0.147 but  free T4 0.90. . Hepatitis C Ab and HIV 1/2 Ag/Ab Non-reactive. Flu/COVID negative. UA and UDS pending. EKG with shows SR, 57 bpm, no acute ischemic changes. CTH allowing for some degree of motion artifact, no convincing acute intracranial abnormalities. CXR with no acute abnormality. The patient received toradol 10mg, tylenol 1g, zofran 4mg, keppra 2g, and ativan 2mg. An additional seizure like episode was witnessed by the ED provider, "Called to bedside the patient had another witnessed seizure lasting less than 1 minute. Patient had no incontinence, no tongue biting he had no change in his vital signs throughout the episode. On my arrival were able to immediately wake the patient up and he is not postictal in any way.  Unclear if this was a true seizure versus a nonepileptic psychogenic seizure."    He did not desire to to go back " to [place]. Acute withdrawal symptoms resolved. Was cleared by psychiatry for dc to home on suboxone.,

## 2023-11-07 ENCOUNTER — NURSE TRIAGE (OUTPATIENT)
Dept: ADMINISTRATIVE | Facility: CLINIC | Age: 38
End: 2023-11-07
Payer: MEDICAID

## 2023-11-07 NOTE — TELEPHONE ENCOUNTER
Patient states he contacted the OOC Triage Service for an explanation of the use of prescriptions he received in the ED. Patient declined triage services at this time and states his Pharmacist has explained the usage of his prescription medications and he understands the explanation.     Patient advised to contact the OOC Triage Service for any worsening symptoms. Patient states understanding at this time.     Reason for Disposition   Caller has cancelled the call before the first contact    Additional Information   Negative: Caller has already spoken with the PCP (or office), and has no further questions   Negative: Caller has already spoken with another triager and has no further questions   Negative: Caller has already spoken with another triager or PCP (or office), and has further questions and triager able to answer questions.   Negative: Busy signal.  First attempt to contact caller.  Follow-up call scheduled within 15 minutes.   Negative: No answer.  First attempt to contact caller.  Follow-up call scheduled within 15 minutes.   Negative: Message left on identified voicemail   Negative: Message left on unidentified voice mail. Phone number verified.   Negative: Message left with person in household   Negative: Wrong number reached. Phone number verified.   Negative: Second attempt to contact caller AND no contact made. Phone number verified.   Negative: Third attempt to contact caller AND no contact made. Phone number verified.   Negative: Cell phone out of range. Phone number verified.   Negative: Patient already left for the hospital/clinic    Protocols used: No Contact or Duplicate Contact Call-A-OH

## 2025-05-15 ENCOUNTER — HOSPITAL ENCOUNTER (OUTPATIENT)
Facility: HOSPITAL | Age: 40
Discharge: HOME | End: 2025-05-16
Attending: EMERGENCY MEDICINE | Admitting: STUDENT IN AN ORGANIZED HEALTH CARE EDUCATION/TRAINING PROGRAM
Payer: MEDICAID

## 2025-05-15 DIAGNOSIS — R00.1 BRADYCARDIA: ICD-10-CM

## 2025-05-15 DIAGNOSIS — R53.1 WEAKNESS: ICD-10-CM

## 2025-05-15 DIAGNOSIS — R07.9 CHEST PAIN: ICD-10-CM

## 2025-05-15 DIAGNOSIS — T40.601A OPIATE OVERDOSE, ACCIDENTAL OR UNINTENTIONAL, INITIAL ENCOUNTER: Primary | ICD-10-CM

## 2025-05-15 LAB — POCT GLUCOSE: 99 MG/DL (ref 70–110)

## 2025-05-15 PROCEDURE — 96374 THER/PROPH/DIAG INJ IV PUSH: CPT

## 2025-05-15 PROCEDURE — 82962 GLUCOSE BLOOD TEST: CPT

## 2025-05-15 PROCEDURE — 93010 ELECTROCARDIOGRAM REPORT: CPT | Mod: ,,, | Performed by: INTERNAL MEDICINE

## 2025-05-15 PROCEDURE — 93005 ELECTROCARDIOGRAM TRACING: CPT

## 2025-05-15 PROCEDURE — 99285 EMERGENCY DEPT VISIT HI MDM: CPT | Mod: 25

## 2025-05-15 RX ORDER — NALOXONE HYDROCHLORIDE 4 MG/.1ML
SPRAY NASAL
Qty: 1 EACH | Refills: 11 | Status: SHIPPED | OUTPATIENT
Start: 2025-05-15 | End: 2025-05-16

## 2025-05-15 NOTE — Clinical Note
"Lou Reevesanderson Cunha was seen and treated in our emergency department on 5/15/2025.  He may return to work on 05/20/2025.       If you have any questions or concerns, please don't hesitate to call.      Sandra Quinn MD"

## 2025-05-15 NOTE — Clinical Note
Diagnosis: Opiate overdose, accidental or unintentional, initial encounter [[id number]]   Future Attending Provider: EILEEN ORTIZ [[id number]]   Is the patient being sent to ED Observation?: No

## 2025-05-16 VITALS
OXYGEN SATURATION: 98 % | DIASTOLIC BLOOD PRESSURE: 70 MMHG | WEIGHT: 170 LBS | HEART RATE: 75 BPM | HEIGHT: 72 IN | TEMPERATURE: 98 F | RESPIRATION RATE: 16 BRPM | SYSTOLIC BLOOD PRESSURE: 138 MMHG | BODY MASS INDEX: 23.03 KG/M2

## 2025-05-16 PROBLEM — T50.901A ACCIDENTAL OVERDOSE: Status: ACTIVE | Noted: 2025-05-16

## 2025-05-16 PROBLEM — R00.1 BRADYCARDIA: Status: ACTIVE | Noted: 2025-05-16

## 2025-05-16 LAB
ABSOLUTE EOSINOPHIL (OHS): 0.12 K/UL
ABSOLUTE MONOCYTE (OHS): 0.73 K/UL (ref 0.3–1)
ABSOLUTE NEUTROPHIL COUNT (OHS): 3.1 K/UL (ref 1.8–7.7)
ALBUMIN SERPL BCP-MCNC: 3.6 G/DL (ref 3.5–5.2)
ALP SERPL-CCNC: 34 UNIT/L (ref 40–150)
ALT SERPL W/O P-5'-P-CCNC: 14 UNIT/L (ref 10–44)
AMPHET UR QL SCN: NEGATIVE
ANION GAP (OHS): 9 MMOL/L (ref 8–16)
AST SERPL-CCNC: 13 UNIT/L (ref 11–45)
BARBITURATE SCN PRESENT UR: NEGATIVE
BASOPHILS # BLD AUTO: 0.02 K/UL
BASOPHILS NFR BLD AUTO: 0.3 %
BENZODIAZ UR QL SCN: ABNORMAL
BILIRUB SERPL-MCNC: 0.5 MG/DL (ref 0.1–1)
BILIRUB UR QL STRIP.AUTO: NEGATIVE
BIPAP: 0
BUN SERPL-MCNC: 9 MG/DL (ref 6–20)
CALCIUM SERPL-MCNC: 8.5 MG/DL (ref 8.7–10.5)
CANNABINOIDS UR QL SCN: ABNORMAL
CHLORIDE SERPL-SCNC: 104 MMOL/L (ref 95–110)
CLARITY UR: CLEAR
CO2 SERPL-SCNC: 27 MMOL/L (ref 23–29)
COCAINE UR QL SCN: ABNORMAL
COLOR UR AUTO: YELLOW
CORRECTED TEMPERATURE (PCO2): 48.3 MMHG
CORRECTED TEMPERATURE (PH): 7.41
CORRECTED TEMPERATURE (PO2): 31.1 MMHG
CREAT SERPL-MCNC: 1 MG/DL (ref 0.5–1.4)
CREAT UR-MCNC: 194 MG/DL (ref 23–375)
CREAT UR-MCNC: 194 MG/DL (ref 23–375)
ERYTHROCYTE [DISTWIDTH] IN BLOOD BY AUTOMATED COUNT: 14.7 % (ref 11.5–14.5)
ETHANOL SERPL-MCNC: <10 MG/DL
FENTANYL UR QL SCN: ABNORMAL
FIO2: 21 %
GFR SERPLBLD CREATININE-BSD FMLA CKD-EPI: >60 ML/MIN/1.73/M2
GLUCOSE SERPL-MCNC: 89 MG/DL (ref 70–110)
GLUCOSE UR QL STRIP: NEGATIVE
HCT VFR BLD AUTO: 38.2 % (ref 40–54)
HGB BLD-MCNC: 13.4 GM/DL (ref 14–18)
HGB UR QL STRIP: NEGATIVE
HOLD SPECIMEN: NORMAL
IMM GRANULOCYTES # BLD AUTO: 0.02 K/UL (ref 0–0.04)
IMM GRANULOCYTES NFR BLD AUTO: 0.3 % (ref 0–0.5)
KETONES UR QL STRIP: NEGATIVE
LEUKOCYTE ESTERASE UR QL STRIP: NEGATIVE
LYMPHOCYTES # BLD AUTO: 2.36 K/UL (ref 1–4.8)
MCH RBC QN AUTO: 28.6 PG (ref 27–31)
MCHC RBC AUTO-ENTMCNC: 35.1 G/DL (ref 32–36)
MCV RBC AUTO: 81 FL (ref 82–98)
METHADONE UR QL SCN: NEGATIVE
NITRITE UR QL STRIP: NEGATIVE
NUCLEATED RBC (/100WBC) (OHS): 0 /100 WBC
OHS QRS DURATION: 110 MS
OHS QRS DURATION: 110 MS
OHS QTC CALCULATION: 418 MS
OHS QTC CALCULATION: 428 MS
OPIATES UR QL SCN: NEGATIVE
PCO2 BLDA: 48.3 MMHG
PCP UR QL: NEGATIVE
PH SMN: 7.41 [PH]
PH UR STRIP: 6 [PH]
PLATELET # BLD AUTO: 219 K/UL (ref 150–450)
PMV BLD AUTO: 10.9 FL (ref 9.2–12.9)
PO2 BLDA: 31.1 MMHG
POC BASE DEFICIT: 4.8 MMOL/L
POC HCO3: 27.6 MMOL/L
POC PERFORMED BY: NORMAL
POC TEMPERATURE: 37 C
POTASSIUM SERPL-SCNC: 4.4 MMOL/L (ref 3.5–5.1)
PROT SERPL-MCNC: 6.4 GM/DL (ref 6–8.4)
PROT UR QL STRIP: NEGATIVE
RBC # BLD AUTO: 4.69 M/UL (ref 4.6–6.2)
RELATIVE EOSINOPHIL (OHS): 1.9 %
RELATIVE LYMPHOCYTE (OHS): 37.2 % (ref 18–48)
RELATIVE MONOCYTE (OHS): 11.5 % (ref 4–15)
RELATIVE NEUTROPHIL (OHS): 48.8 % (ref 38–73)
SODIUM SERPL-SCNC: 140 MMOL/L (ref 136–145)
SP GR UR STRIP: 1.02
SPECIMEN SOURCE: NORMAL
UROBILINOGEN UR STRIP-ACNC: NEGATIVE EU/DL
WBC # BLD AUTO: 6.35 K/UL (ref 3.9–12.7)

## 2025-05-16 PROCEDURE — 82077 ASSAY SPEC XCP UR&BREATH IA: CPT

## 2025-05-16 PROCEDURE — G0378 HOSPITAL OBSERVATION PER HR: HCPCS

## 2025-05-16 PROCEDURE — 80307 DRUG TEST PRSMV CHEM ANLYZR: CPT

## 2025-05-16 PROCEDURE — 93005 ELECTROCARDIOGRAM TRACING: CPT

## 2025-05-16 PROCEDURE — 85025 COMPLETE CBC W/AUTO DIFF WBC: CPT

## 2025-05-16 PROCEDURE — 81003 URINALYSIS AUTO W/O SCOPE: CPT

## 2025-05-16 PROCEDURE — 63600175 PHARM REV CODE 636 W HCPCS

## 2025-05-16 PROCEDURE — 99900035 HC TECH TIME PER 15 MIN (STAT)

## 2025-05-16 PROCEDURE — 93010 ELECTROCARDIOGRAM REPORT: CPT | Mod: ,,, | Performed by: INTERNAL MEDICINE

## 2025-05-16 PROCEDURE — 80053 COMPREHEN METABOLIC PANEL: CPT

## 2025-05-16 PROCEDURE — 82803 BLOOD GASES ANY COMBINATION: CPT

## 2025-05-16 PROCEDURE — 90792 PSYCH DIAG EVAL W/MED SRVCS: CPT | Mod: AF,HB,, | Performed by: PSYCHIATRY & NEUROLOGY

## 2025-05-16 RX ORDER — IBUPROFEN 200 MG
24 TABLET ORAL
Status: DISCONTINUED | OUTPATIENT
Start: 2025-05-16 | End: 2025-05-16 | Stop reason: HOSPADM

## 2025-05-16 RX ORDER — DICYCLOMINE HYDROCHLORIDE 10 MG/1
10 CAPSULE ORAL EVERY 6 HOURS PRN
Status: DISCONTINUED | OUTPATIENT
Start: 2025-05-16 | End: 2025-05-16 | Stop reason: HOSPADM

## 2025-05-16 RX ORDER — IBUPROFEN 200 MG
16 TABLET ORAL
Status: DISCONTINUED | OUTPATIENT
Start: 2025-05-16 | End: 2025-05-16 | Stop reason: HOSPADM

## 2025-05-16 RX ORDER — GLUCAGON 1 MG
1 KIT INJECTION
Status: DISCONTINUED | OUTPATIENT
Start: 2025-05-16 | End: 2025-05-16 | Stop reason: HOSPADM

## 2025-05-16 RX ORDER — HYDROXYZINE HYDROCHLORIDE 25 MG/1
50 TABLET, FILM COATED ORAL EVERY 6 HOURS PRN
Status: DISCONTINUED | OUTPATIENT
Start: 2025-05-16 | End: 2025-05-16 | Stop reason: HOSPADM

## 2025-05-16 RX ORDER — PROMETHAZINE HYDROCHLORIDE 25 MG/1
25 TABLET ORAL EVERY 6 HOURS PRN
Status: DISCONTINUED | OUTPATIENT
Start: 2025-05-16 | End: 2025-05-16 | Stop reason: HOSPADM

## 2025-05-16 RX ORDER — SODIUM CHLORIDE 0.9 % (FLUSH) 0.9 %
10 SYRINGE (ML) INJECTION EVERY 12 HOURS PRN
Status: DISCONTINUED | OUTPATIENT
Start: 2025-05-16 | End: 2025-05-16 | Stop reason: HOSPADM

## 2025-05-16 RX ORDER — NALOXONE HYDROCHLORIDE 4 MG/.1ML
SPRAY NASAL
Qty: 2 EACH | Refills: 11 | Status: SHIPPED | OUTPATIENT
Start: 2025-05-16

## 2025-05-16 RX ORDER — ACETAMINOPHEN 325 MG/1
650 TABLET ORAL EVERY 8 HOURS PRN
Status: DISCONTINUED | OUTPATIENT
Start: 2025-05-16 | End: 2025-05-16 | Stop reason: HOSPADM

## 2025-05-16 RX ORDER — NALOXONE HCL 0.4 MG/ML
0.02 VIAL (ML) INJECTION
Status: DISCONTINUED | OUTPATIENT
Start: 2025-05-16 | End: 2025-05-16 | Stop reason: HOSPADM

## 2025-05-16 RX ORDER — IBUPROFEN 600 MG/1
600 TABLET, FILM COATED ORAL EVERY 6 HOURS PRN
Status: DISCONTINUED | OUTPATIENT
Start: 2025-05-16 | End: 2025-05-16 | Stop reason: HOSPADM

## 2025-05-16 RX ADMIN — NALXONE HYDROCHLORIDE 0.02 MG: 0.4 INJECTION INTRAMUSCULAR; INTRAVENOUS; SUBCUTANEOUS at 07:05

## 2025-05-16 NOTE — ED TRIAGE NOTES
"Lou Cunha, a 39 y.o. male presents to the ED w/ complaint of drug overdose[place] E[place] gave 2mg narcan iv. Pt responded to narcan. Pt hx of substance abuse. Pt states he took "3 percocets"     Triage note:  Chief Complaint   Patient presents with    Drug Overdose     [place], EMS gave 2 MG of Narcan IV on route. Pt is 99% RA, alert x 4 at this time. Pt states that he took "3 pills of Percocet"      Review of patient's allergies indicates:  No Known Allergies  History reviewed. No pertinent past medical history.   "

## 2025-05-16 NOTE — PHARMACY MED REC
"Admission Medication History     The home medication history was taken by Suman Mak.    You may go to "Admission" then "Reconcile Home Medications" tabs to review and/or act upon these items.     The home medication list has been updated by the Pharmacy department.   Please read ALL comments highlighted in yellow.   Please address this information as you see fit.    Feel free to contact us if you have any questions or require assistance.      The medications listed below were removed from the home medication list. Please reorder if appropriate:  Patient reports no longer taking the following medication(s):  PROMETHAZINE 25 MG    Medications listed below were obtained from: Patient/family and Analytic software- [employer]  Current Outpatient Medications on File Prior to Encounter   Medication Sig    multivit-minerals/FA/lycopene (ONE-A-DAY MEN'S ORAL) Take 1 tablet by mouth once daily.                 Suman Mak  EXT [id number]                  .          "

## 2025-05-16 NOTE — ED PROVIDER NOTES
"Encounter Date: 5/15/2025       History     Chief Complaint   Patient presents with    Drug Overdose     [place], EMS gave 2 MG of Narcan IV on route. Pt is 99% RA, alert x 4 at this time. Pt states that he took "3 pills of Percocet"      Lou Cunha is a 39 y.o. male who has no past medical history on file.    The patient presents to the ED due to overdose on percocet, 10 mg x3 and unknown amount of Xanax at the movie theater, per EMS, pt was given 2 MG of Narcan IV en route. Pt is 99% RA, alert x 4 at this time.  Patient states that he has remote history of bradycardia diagnosed [place] [facility]  He has a scheduled appointment with Cardiology Arya on 6/30.  Patient denies headache, vision changes, breath, chest pain, abdominal pain, neck pain or headache.  Patient does feel fatigued lethargic.    The history is provided by the patient, the EMS personnel and medical records. No  was used.     Review of patient's allergies indicates:  No Known Allergies  History reviewed. No pertinent past medical history.  History reviewed. No pertinent surgical history.  No family history on file.  Social History[1]  Review of Systems   All other systems reviewed and are negative.      Physical Exam     Initial Vitals [05/15/25 2227]   BP Pulse Resp Temp SpO2   (!) 152/100 94 18 97.2 °F (36.2 °C) 99 %      MAP       --         Physical Exam    Nursing note and vitals reviewed.  Constitutional: He appears well-developed and well-nourished. He appears lethargic. No distress.   HENT:   Head: Normocephalic and atraumatic.   Eyes: Conjunctivae and EOM are normal. Pupils are equal, round, and reactive to light.   Miotic pupils bilaterally   Neck: Neck supple. No JVD present.   Normal range of motion.  Cardiovascular:  Normal rate, regular rhythm, normal heart sounds and intact distal pulses.           Pulmonary/Chest: Breath sounds normal. No stridor. No respiratory distress. He " has no wheezes. He has no rales.   Abdominal: Abdomen is soft. He exhibits no distension.   Musculoskeletal:         General: Normal range of motion.      Cervical back: Normal range of motion and neck supple.     Neurological: He is oriented to person, place, and time. He has normal strength. He appears lethargic. GCS score is 15. GCS eye subscore is 4. GCS verbal subscore is 5. GCS motor subscore is 6.   Skin: Skin is warm and dry. Capillary refill takes less than 2 seconds.   Psychiatric: He has a normal mood and affect. His behavior is normal. Judgment and thought content normal.         ED Course   Procedures  Labs Reviewed   COMPREHENSIVE METABOLIC PANEL - Abnormal       Result Value    Sodium 140      Potassium 4.4      Chloride 104      CO2 27      Glucose 89      BUN 9      Creatinine 1.0      Calcium 8.5 (*)     Protein Total 6.4      Albumin 3.6      Bilirubin Total 0.5      ALP 34 (*)     AST 13      ALT 14      Anion Gap 9      eGFR >60     FENTANYL, URINE - Abnormal    Fentanyl, Urine Presumptive Positive (*)     Urine Creatinine 194.0     DRUG SCREEN PANEL, URINE EMERGENCY - Abnormal    Benzodiazepine, Urine Presumptive Positive (*)     Methadone, Urine Negative      Cocaine, Urine Presumptive Positive (*)     Opiates, Urine Negative      Barbiturates, Urine Negative      Amphetamines, Urine Negative      THC Presumptive Positive (*)     Phencyclidine, Urine Negative      Urine Creatinine 194.0      Narrative:     This screen includes the following classes of drugs at the listed cut-off:     Benzodiazepines:        200 ng/ml   Methadone:              300 ng/ml   Cocaine metabolite:     300 ng/ml   Opiates:                300 ng/ml   Barbiturates:           200 ng/ml   Amphetamines:           1000 ng/ml   Marijuana metabs (THC): 50 ng/ml   Phencyclidine (PCP):    25 ng/ml     This is a screening test. If results do not correlate with clinical presentation, then a confirmatory send out test is  "advised.    This report is intended for use in clinical monitoring and management of patients. It is not intended for use in employment related drug testing."   CBC WITH DIFFERENTIAL - Abnormal    WBC 6.35      RBC 4.69      HGB 13.4 (*)     HCT 38.2 (*)     MCV 81 (*)     MCH 28.6      MCHC 35.1      RDW 14.7 (*)     Platelet Count 219      MPV 10.9      Nucleated RBC 0      Neut % 48.8      Lymph % 37.2      Mono % 11.5      Eos % 1.9      Basophil % 0.3      Imm Grans % 0.3      Neut # 3.10      Lymph # 2.36      Mono # 0.73      Eos # 0.12      Baso # 0.02      Imm Grans # 0.02     URINALYSIS, REFLEX TO URINE CULTURE - Normal    Color, UA Yellow      Appearance, UA Clear      pH, UA 6.0      Spec Grav UA 1.020      Protein, UA Negative      Glucose, UA Negative      Ketones, UA Negative      Bilirubin, UA Negative      Blood, UA Negative      Nitrites, UA Negative      Urobilinogen, UA Negative      Leukocyte Esterase, UA Negative     ALCOHOL,MEDICAL (ETHANOL) - Normal    Alcohol, Serum <10     CBC W/ AUTO DIFFERENTIAL    Narrative:     The following orders were created for panel order CBC auto differential.  Procedure                               Abnormality         Status                     ---------                               -----------         ------                     CBC with Differential[[id number]]       Abnormal            Final result                 Please view results for these tests on the individual orders.   GREY TOP URINE HOLD    Extra Tube Hold for add-ons.     POCT GLUCOSE    POCT Glucose 99          ECG Results              EKG 12-lead (Final result)        Collection Time Result Time QRS Duration OHS QTC Calculation    05/16/25 07:55:29 05/16/25 10:57:08 110 428                     Final result by Interface, Lab In [facility] (05/16/25 10:57:16)                   Narrative:    Test Reason : R00.1,    Vent. Rate :  71 BPM     Atrial Rate :  71 BPM     P-R Int : 156 ms          QRS Dur : " 110 ms      QT Int : 394 ms       P-R-T Axes :  53  45  -4 degrees    QTcB Int : 428 ms    Normal sinus rhythm  Normal ECG  When compared with ECG of 15-May-2025 22:40,  No significant change was found  Confirmed by Gino Mcintosh ([id number]) on 5/16/2025 10:57:05 AM    Referred By: ANIKETREFERRAL SELF           Confirmed By: Gino cMintosh                                     EKG 12-lead (Final result)        Collection Time Result Time QRS Duration OHS QTC Calculation    05/15/25 22:40:02 05/16/25 07:37:48 110 418                     Final result by Interface, Lab In [facility] (05/16/25 07:37:50)                   Narrative:    Test Reason : R53.1,    Vent. Rate :  72 BPM     Atrial Rate :  72 BPM     P-R Int : 142 ms          QRS Dur : 110 ms      QT Int : 382 ms       P-R-T Axes :  44  39  -3 degrees    QTcB Int : 418 ms    Normal sinus rhythm  Nonspecific T wave abnormality  Abnormal ECG  When compared with ECG of 30-Oct-2023 16:18,  Borderline  Criteria for Anteroseptal infarct are no longer Present  ST less elevated in Lateral leads  T wave amplitude has increased in Lateral leads  Confirmed by Gino Mcintosh ([id number]) on 5/16/2025 7:37:45 AM    Referred By: AAAREFERRAL SELF           Confirmed By: Gino Mcintosh                                  Imaging Results    None          Medications - No data to display  Medical Decision Making  39-year-old male, no distress however clinically intoxicated, sedated, awakens to voice.  Benign cardiac, respiratory, abdominal exam.  Patient awakes to voice.    Plan: Patient has been observed in the ED for greater than 4 hours, patient continues to be clinically intoxicated, sedated.  No obvious indication for immediate Narcan use however concern for continued intoxication.  We will admit patient for further evaluation.    Amount and/or Complexity of Data Reviewed  Labs: ordered. Decision-making details documented in ED Course.               ED Course as of 05/17/25 1801   Fri May 16, 2025   0142 POCT  Glucose: 99  Euglycemic [JL]   0459 Patient is a 39-year-old male with past medical history substance abuse that is presenting for accidental overdose.  Patient admits that he took 3X 10 mg Percocets and then Xanax.  Patient was found unresponsive, given 2 mg Narcan.  Patient returned to baseline mentation.  Wife states that patient has history of substance abuse, has been trying to stop.    Physical exam: Well-appearing 39-year-old male, no distress however clinically intoxicated, sedated, awakens to voice.  Benign cardiac, respiratory, abdominal exam.  Patient awakes to voice.    Plan: Patient has been observed in the ED for greater than 4 hours, patient continues to be clinically intoxicated, sedated.  No obvious indication for immediate Narcan use however concern for continued intoxication.  We will admit patient for further evaluation.    Attestation of Dr. Vaz (Attending Physician):      I have reviewed the record and the patient care provided by the Resident provider and agree with the documented HPI, ROS, PE.  I also agree with the treatment plan and work up and I have performed an independent history / physical exam and MDM.   [RT]      ED Course User Index  [JL] Sandra Quinn MD  [RT] Laurent Vaz MD                           Clinical Impression:  Final diagnoses:  [R53.1] Weakness  [T40.601A] Opiate overdose, accidental or unintentional, initial encounter (Primary)          ED Disposition Condition    Observation                   Sandra Quinn MD  Resident  05/16/25 5714         [1]   Social History  Tobacco Use    Smoking status: Unknown   Substance Use Topics    Alcohol use: Not Currently    Drug use: Yes     Types: Heroin     Comment: Quit 7 days PTA        Laurent Vaz MD  05/17/25 [id number]

## 2025-05-16 NOTE — HOSPITAL COURSE
Patient presented after overdose on Percocet (10 mg x3) and unknown quantity of Xanax at a movie theater. Treated with Narcan en route with improvement. UDS positive for benzos, cocaine, THC, and fentanyl. Now A&O x4, satting appropriately on room air. Significant other and children at bedside. Discharged with Narcan and referral to addiction psychiatry.
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
## 2025-05-16 NOTE — ASSESSMENT & PLAN NOTE
Patient presents with drug overdose, reports taking xanax and percocets. He has previous admission for drug use with opiates, heroin, xanax and withdraws. He tried detox at [facility] on 10/28 but left ama, same in April where he tried to detox at [facility] but did not complete it either.  Denies any other illicit drug use or ETOH use. Previous UDS positive for opiates, cocaine, fentanyl, THC, and benzos     - Started on COWS protocol   -UDS , cocaine, fentanyl, THC, and benzos.  - F/u etoh   - Continue to monitor for withdraws   -PRN , flexeril, ibuprofen and hydroxyzine.  -PRN antiemetics.   - Wife would like addiction psych to help with resources and to see someone outpatient to help patient detox

## 2025-05-16 NOTE — ASSESSMENT & PLAN NOTE
- Atropine prn as needed   - Continue tele  -Has cardiology outpatient apt scheduled for June

## 2025-05-16 NOTE — SUBJECTIVE & OBJECTIVE
History reviewed. No pertinent past medical history.    History reviewed. No pertinent surgical history.    Review of patient's allergies indicates:  No Known Allergies    No current facility-administered medications on file prior to encounter.     Current Outpatient Medications on File Prior to Encounter   Medication Sig    buprenorphine-naloxone (ZUBSOLV) 8.6-2.1 mg Subl Place 1 tablet under the tongue once daily.    promethazine (PHENERGAN) 25 MG tablet Take 1 tablet (25 mg total) by mouth every 6 (six) hours as needed for Nausea.     Family History    None       Tobacco Use    Smoking status: Unknown    Smokeless tobacco: Not on file   Substance and Sexual Activity    Alcohol use: Not Currently    Drug use: Yes     Types: Heroin     Comment: Quit 7 days PTA    Sexual activity: Yes     Partners: Female     Review of Systems   Constitutional:  Positive for fatigue.   Respiratory:  Negative for apnea, cough, chest tightness and shortness of breath.    Cardiovascular:  Negative for chest pain and leg swelling.   Gastrointestinal:  Negative for abdominal pain.   Musculoskeletal:  Negative for myalgias.   Psychiatric/Behavioral:  Negative for suicidal ideas.      Objective:     Vital Signs (Most Recent):  Temp: 98 °F (36.7 °C) (05/16/25 0537)  Pulse: (!) 56 (05/16/25 0600)  Resp: 12 (05/16/25 0537)  BP: 124/71 (05/16/25 0537)  SpO2: 96 % (05/16/25 0600) Vital Signs (24h Range):  Temp:  [97.2 °F (36.2 °C)-98.4 °F (36.9 °C)] 98 °F (36.7 °C)  Pulse:  [55-94] 56  Resp:  [10-18] 12  SpO2:  [94 %-99 %] 96 %  BP: (116-152)/() 124/71     Weight: 77.1 kg (169 lb 15.6 oz)  Body mass index is 23.05 kg/m².     Physical Exam  Constitutional:       General: He is sleeping. He is not in acute distress.     Appearance: He is not toxic-appearing.   HENT:      Head: Atraumatic.      Nose: Nose normal.      Mouth/Throat:      Mouth: Mucous membranes are moist.   Cardiovascular:      Rate and Rhythm: Bradycardia present.       Pulses: Normal pulses.      Heart sounds: No murmur heard.  Pulmonary:      Effort: Pulmonary effort is normal. No respiratory distress.      Breath sounds: No wheezing or rales.   Abdominal:      Palpations: Abdomen is soft.      Tenderness: There is no abdominal tenderness.   Musculoskeletal:      Cervical back: Normal range of motion.      Right lower leg: No edema.      Left lower leg: No edema.   Skin:     General: Skin is warm.   Neurological:      Mental Status: He is easily aroused.                Significant Labs: All pertinent labs within the past 24 hours have been reviewed.  CBC:   Recent Labs   Lab 05/16/25  0336   WBC 6.35   HGB 13.4*   HCT 38.2*        CMP:   Recent Labs   Lab 05/16/25  0336      K 4.4      CO2 27   GLU 89   BUN 9   CREATININE 1.0   CALCIUM 8.5*   PROT 6.4   ALBUMIN 3.6   BILITOT 0.5   ALKPHOS 34*   AST 13   ALT 14   ANIONGAP 9       Significant Imaging: I have reviewed all pertinent imaging results/findings within the past 24 hours.
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
## 2025-05-16 NOTE — ASSESSMENT & PLAN NOTE
Patient presents with drug overdose, reports taking xanax and percocets. He has previous admission for drug use with opiates, heroin, xanax and withdraws. He tried detox at [facility] on 10/28 but left ama, same in April where he tried to detox at [facility] but did not complete it either.  Denies any other illicit drug use or ETOH use. Previous UDS positive for opiates, cocaine, fentanyl, THC, and benzos     - Started on COWS protocol   -UDS , cocaine, fentanyl, THC, and benzos.  - F/u etoh   - Continue to monitor for withdraws   -PRN , flexeril, ibuprofen and hydroxyzine.  -PRN antiemetics.   - Wife would like addiction psych to help with resources and to see someone outpatient to help patient detox

## 2025-05-16 NOTE — DISCHARGE INSTRUCTIONS
Diagnosis:  Opioid overdose    Tests today showed:   Labs Reviewed   HEPATITIS C ANTIBODY   HEP C VIRUS HOLD SPECIMEN   HIV 1 / 2 ANTIBODY   POCT GLUCOSE MONITORING CONTINUOUS     No orders to display       Treatments you had today:   Medications - No data to display    Follow-Up Plan:  - Follow-up with primary care doctor within 3 - 5 days  - Additional testing and/or evaluation as directed by your primary doctor    Return to the Emergency Department for symptoms including but not limited to: worsening symptoms, shortness of breath or chest pain, vomiting with inability to hold down fluids, fevers greater than 100.4°F, passing out/fainting/unconsciousness, or other concerning symptoms.          REFERRAL RECOMMENDATIONS FOR SUBSTANCE ABUSE & MENTAL HEALTH      IN CASE OF SUICIDAL THINKING, call the [employer] Suicide Hotline Number: 988    988 Suicide & Crisis Lifeline: 987 , 6-133-250-TALK (4321)  https://[employer].AmpIdea       SUBSTANCE ABUSE:     [facility] (formerly known as the ABU)  [x] [phone], Option 2  [x] [address] [facility] 4th Floor, [facility] [address]  [x] [email or web address]-[email or web address]  [x] The [facility] delivers comprehensive and collaborative treatment for alcohol and substance use disorders.  Excellent program for working professionals or anyone else seeking recovery.  [x] Requires insurance approval prior to starting program, call number above for more information.  [x] Intensive Outpatient Rehabilitation Program - M-F 9am-3pm - daily groups with psychologists and social workers, sessions with MDs 3x per week   [x] Ambulatory detox and dual diagnosis available      SUBOXONE:     NOTE: some Suboxone clinics require their clients to participate in a structured program (such as an IOP) in order to be prescribed Suboxone.  Some clinics have a long waiting list.  Most of these clinics do not accept walk-in clients, so call first to to learn  what must be done to get started on Suboxone.    [facility] - [phone] (can do Sublocade)  [address] [facility] [facility] [address], [place] [address]    [facility]  [address] [place], [place]  [phone]    [facility] - [phone] (can do Sublocade)  [address] [place] [address]    [facility]  [address] [place]  [phone]     [facility] (very short waiting list, may accept some walk-in's but call first if possible)  [address], [place] [address]  [phone]; [phone]    [facility]   [address] [place]    [phone]    [facility] (can usually be seen within a week but is cash only for appointment)  [address] [place], [place]    [facility] ([facility])  [address] [place] [address]  [phone]    [facility] ([facility])  [address] [place], [place]  [phone]    [facility] ([facility])  [address] [facility]1], [place] [address]  [phone]    [place], [place]:    [facility] - [address] [place] [address] - Tel: [phone]    Boni Abel - [address] [place] [address] - Tel: [phone]    Roney [place] - [address] [place] [place] [address] - Tel: [phone]    Rodrigo Adan - [address] [place] [place] [address] - Tel: [phone]    Willam Kaur - [address] [place] [address] - Tel: [phone]    [place], [place]:     Dr. Dustin Alexander and Dr. Onur Aguirre - [address] [facility], [place], [place] - Tel: [phone]    Dr. Nicolasa Stewart - [address] [place], [place] - Tel: [phone]    Dr. Dickson Capone - Tel: [phone]    [facility]. [facility] - [phone]      METHADONE:     [facility] (the only methadone clinic in the [facility], has two locations)  [x] [place] - [address] [place] [address], [phone]  [x] [place] [address], [phone]    12 STEP PROGRAMS (and similar):      Alcoholics Anonymous (local)  [x] [phone]  [x] www.[place].org for schedules for in-person and online meetings  [x] There are AA meetings throughout the day all over town  [x] AA costs nothing to attend; they pass a basket for donations but this is not required    Narcotics Anonymous  [x] [phone]  [x] www.noana.org  [x] There are NA meetings throughout the day all over town  [x] NA costs nothing to attend; they pass a basket for donations but this is not required    Alcoholics Anonymous Online Intergroup (national)  [x] www.aa-intergroup.org  [x] Good resource for large, nation-wide meetings  [x] Can also attend smaller, local meetings in other cities  [x] Countless meetings all day and all night  [x] AA costs nothing to attend; they pass a basket for donations but this is not required    [employer] - 24/7 zoom meetings for women and coed - sign on anytime, anywhere!  https://Ztail/87-8-jiyhswbo/    Online Intergroup of AA - 121 Open AA [place] Meeting - 24/7 zoom meetings  https://aa-intergroup.org/meetings/    LOOKING FOR AN ALTERNATIVE TO 12 STEP PROGRAMS - check out:  [facility] Recovery: https://www.smartrecovery.org/about-us  [facility]: https://recoverydharma.org      DETOX UNITS (USUALLY 5-7 DAYS):     [facility]: [address], [place]  [phone], call first to ensure bed availability    [facility]: [address] [place]  [phone], Option 1, call first to ensure bed availability    [facility]: [address], [place]  [phone] (intake by appointment only)    [facility]: [address], [place]  [phone]      INTENSIVE OUTPATIENT PROGRAMS:     [facility] (formerly known as the ABU)  [x] [phone], Option 2  [x] [address] [facility] [facility] 4th Floor, [place] [address]  [x] [email or web address]://www.[email or web address].org/services/[email or web address]-recovery-program  [x] The [facility] delivers comprehensive  and collaborative treatment for alcohol and substance use disorders.  Excellent program for working professionals or anyone else seeking recovery.  [x] Requires insurance approval prior to starting program, call number above for more information.  [x] Intensive Outpatient Rehabilitation Program - M-F 9am-3pm - daily groups with psychologists and social workers, sessions with MDs 3x per week   [x] Ambulatory detox and dual diagnosis available    [facility] Intensive [facility]patient Program  [x] [phone]  [x] [facility] (the clinic not on [facility])  [x] Call number above for more info and to check insurance requirements    [facility]  [address] [address], [place] [address] [phone]    [facility]:  [address] [facility], [address] [place], [place] [address]?, [phone]  [address] [facility] [place], [place] [address]?, [phone]    RESIDENTIAL REHABS (USUALLY 28 DAYS):     [facility]: [address], [phone]    [facility]: [address] [facility]  [phone] (intake by appointment only)    [facility] (men only) [address] [facility], [phone]    [facility] (Female only) [address] [facility], [phone]    [facility]: [address], [facility], men's program [phone], women's program [phone]    [facility]: [address], [facility], [phone]    [facility]: [address], [place], [place], [phone]    [facility]: Men only, [phone], [address] [place]    [facility]: [address], [place], [place], [phone]    [facility]: [address] [place] [address],  [phone]  New Location: [address] [place] [address] [address], [place] [address], [phone]    [facility]:   ?[address]?[place], [place] [address]?[phone]    [place]: [address], [place] [address], [phone]    [place]: [place], [phone]     [facility]: [place], [place], [phone]    [facility]: [place], [place], [phone]    [facility]: [place], [place], [phone]    [place]: JOSE Garcia, [phone]    [facility]: [address], [place] [address], [phone]    COMMUNITY ADDICTION CLINICS:     [facility]: [address] [facility], [address] [place], [phone] -or- [address] [place] [address]    [facility] [place]: [address] [facility], [place]  [address]     [facility]: Clinics [phone]; Crisis [phone]    [facility]: [address] [place] [place] [address]    [facility]: [address] [place] [address]    [facility]: [address] [address],    [facility]: 2nd Floor [address] [place] [address]    [facility]: [address] [place], [place] [address]27 [facility] [place] [address]    [facility]: [address] [place] [address], [place] [phone]  (serves youth 16-23 years old)    [facility]: [facility]/ [place] [phone]    [facility]: [address] [facility], [place] [phone]    [facility]: [address] [phone]    [facility]: [facility], [facility], [phone]     [facility]: [address] [place] [address], [place], [phone], [phone]    RESOURCES IN OTHER [facility]:     [facility]: [address], [place], [phone], [phone]    [facility]: [address] [facility], [address], [phone]    [facility], [address] [place] [address]  "[phone]    [facility]: [address] [facility] [place], [phone]    [facility], [address] [facility], [phone] ([facility])    [facility], [address] [facility], [phone] ([facility])    [facility], [address] [facility], [address], [place], [phone] ([place] [place], and [facility])    [facility], [address], [place], [phone] ([facility])    [facility] Hotline [phone], [phone]    [facility], [address] [facility], [facility], [address] [facility], [address], [facility] [place], [address] [facility], [facility] [place], [address] [facility] [address]., [facility] Behavioral [facility], [address], [facility], [address] [facility], [address] [phone]    [facility], [address] [facility] [phone]    [facility], [address] [facility], [phone]    [facility] for Addiction Recovery, [address] [place] [address], [phone]      Pashto SPEAKING (en español):     Información de la reunión de Alcohólicos Anónimos  [facility], 10:00 am  Habla español  Esta reunión está abierta y cualquiera puede asistir.    Greenlandic speaking Alcoholics anonymous meetings:  El "Chris Depue AA Skype" es un chris on line de Alcohólicos Anónimos en español. El chris es chandler, gratuito y virtual a través de Skype Audio. El chris funciona mediante savi llamada grupal de voz, por lo que no se utiliza la videollamada, ni se pueden ramon las imágenes o rostros de los participantes. Hace donell años y medio abrimos el primer Chris de AA por [place] en [place], connor actualmente " asisten personas desde [place], [place], [place], [place], [place], [place], [place], [place], [place], [place], [place] y [place], entre otros.    El delia es muy útil para los alcohólicos que residen en lugares donde no se celebran reuniones de AA, o residen en lugares donde las reuniones de AA son un número limitado de días a la semana, o para aquellos compañeros que se hayan de viaje o que, por cualquier motivo, se hayan convalecientes y no pueden desplazarse. Todos los días nos reuniones a las 21:00 (hora española)    Podéis obtener más información sobre el delia y tania sesiones en la página web [email or web address]      MENTAL HEALTH:     [facility] - Outpatient Clinic  [phone]    [facility] - General Psychiatry Intensive Outpatient Program  [facility] (formerly known as the BMU)  [phone], option 3    [facility] - Dual Diagnosis Intensive Outpatient Program  [facility] (formerly known as the ABU)  [phone], option 2      COMMUNITY MENTAL HEALTH CENTERS:     [facility]  (aka [facility], [facility])  Serves [facility] [place] [facility].  Serves uninsured patients & those with Medicaid.  Main location: [address] [place] [id number] [phone]  Walk-in's available during regular business hours.  24/7 Crisis Line: [phone]    [facility]  (aka [facility], [facility])  Serves [facility].  Serves uninsured patients, those with Medicaid and some private plans.  Walk-in's available during regular business hours.  Primary care services available as well.  [facility]: [address] [place] [address];  [phone]  [place]: [address] [place] [address];  [phone]  24/7 Crisis Line:  [phone]    [facility]  Serves uninsured patients & those with Medicaid, call for more info.  Primary care, pediatrics, HIV treatment, and dentistry services available as well.  Three locations.  [phone]    [facility] [place]Corporate Office  Serves patients with Medicaid, Medicare, and private insurance  [address]  [place],?[place] [address]  [phone]    [facility]  Serves uninsured on a sliding scale, as well as Medicaid, Medicare, and private plans.  Eight locations around the [facility].  [phone]    [facility]  Serves uninsured patients & those with Medicaid, private insurances.  Primary care available as well.  [phone]  [address] [place] [address]    [facility] Outpatient Psychiatry  Serves veterans who were honorably discharged.  [address] [place] [address]  [phone]  24/7 Veterans Crisis Line: [phone] (Press 1)    If you have private insurance and need to find a specialist, please contact your insurance network to request a list of providers covered by your benefits.      MENTAL HEALTH/ADDICTIVE DISORDERS:     AA ([phone]), NA ([phone])   National Suicide Prevention Lifeline- Call 1-831.259.2839 Available 24 hours everyday  [facility] [phone]; Crisis Line [phone] - Call for options A-F:  Intensive Outpatient Treatment/ Day programs   [facility], please contact   [facility], please contact [phone] or [phone] to speak with an admissions counselor.  [facility] (Methadone Maintenance)   [address] [place] [address], [phone]  [address] [place] [address] [phone]  [facility], [address] [place] [address], [phone]  [facility] [place], [place] [phone]  [facility] [place] please  contact [phone]  [facility], [address], 4th [place] [address] Phone: [phone]   [facility]  [place] Office: [address] [place] [address], [phone]  [place] Office: [address] [facility], [address], [place] [address], [phone]  [place] Office: [address] [place] [address] [phone]    Outpatient Substance Abuse Treatment   [facility] (Methadone Maintenance)   [address] [place] [address], [phone]  [address], [place] [address] [phone]  [facility], [address] [place] [address], [phone]  [facility]  [place] Office: [address] [place] [address] [address], [phone]  [place] Office: [address] [facility], [address], [place] [address], [phone]  [place] Office: [address] [place] [address] [phone]  [place] [facility], [address] [place] [address] [phone]   [facility], [address] [facility], [address], [phone]  [facility], [address] [place], [place] [phone]    Residential Substance Abuse Treatment   [facility] [address] [facility], [phone]  [facility], [address] [facility], [phone]  [facility] (men only) [address] [place] [address], [phone]  [facility] at the [facility] (women only) [address] [place] [address] [phone]  [facility], [address] [facility] [place] [address] [phone]  [facility] (women only), intakes at [address] [facility], [phone]  [facility] (7-day program, $100, [address] Rd, [phone], [phone], [phone])  [place] [facility] (Men only, [phone]), [address], [place] (Vets*/Non-Vets)  [facility] (Men only, $400/month program fee) [address] [facility] [address], [phone]  [facility] (Women over age 39 only),  [address] [facility], [phone]    Out of Area:    [place], [address] [facility] [address], [place], [place] ([phone])  [facility] Recovery Program (men only), [address] [place] [address], [phone]  [facility], [address] [place], [place] ([phone])  [place], [address] [place], [place] ([phone])  [facility], [address] [facility], [phone]  [facility] (Women only, has to have mental illness, can be homeless or substance abuser), [phone]        DOMESTIC VIOLENCE RESOURCES:     Advocacy  [facility] ([facility])  [address] [address]    [facility] ? [phone]  Services provided: emergency shelter, individual advocacy, information and referrals, group support, children's program, medical advocacy, forensic medical exams, primary care, legal assistance, counseling, safety planning, and caregiver support    [facility] HEALING AND EMPOWERMENT [place]  Confidential location  [facility] ? [phone]  Services provided: short term emergency shelter, all services provided are free of charge    [facility] CENTERS FOR COMMUNITY ADVOCACY  Multiple locations in [facility], [facility], [place], and [facility] ([place], [place], and [facility] ? [phone]  Services provided: emergency shelter, individual advocacy, information and referrals, group support, children's program, medical advocacy, legal assistance in obtaining restraining orders, counseling, safety planning, and caregiver support    [facility]   Emergency Shelter   [phone]  Emergency Services ,Legal and Financial Assistance Services ,Housing Services ,Support Services     [place] [facility] longterm   [phone]  Emergency Services ,Counseling Services , Housing Services ,Support Services ,Children's Services     WOMEN WITH A VISION  [address] [facility] [place] [address]  WWAV ? [phone]  Services provided: advocacy, health education and supportive services, specializing in free healing services for marginalized groups, including LGBTQ individuals and sex workers    SEXUAL TRAUMA AWARENESS AND RESPONSE (STAR)  [address] [place] [address]    STAR ? [phone]  Services provided: individual advocacy, information and referrals, group support, medical advocacy, legal assistance, counseling, and safety planning for survivors of sexual assault    [facility])  [address] [facility] [place] [address]  [facility] Forensic Program ? [phone]  Services provided: free forensic medical exams for sexual assault and domestic violence, which can be performed up to 5 days after an incident. It is not necessary to make a police report to receive a forensic medical exam    Legal  [facility]  [address] [place] [address]  [facility] ? [phone]  Services provided: free emergency legal representation for survivors of doemstic violence residing in [facility]. Legal services may include temporary restraining orders, temporary child support, custody, and use of property    [facility] LEGAL SERVICES (SLLS)  [address] [facility] [address] [place] [address]  SLLS ? [phone]  Services provided: free legal representation for survivors of domestic violence residing in [facility]. Legal services may include temporary child support, custody, and divorce      HOTLINES:     [facility] DOMESTIC VIOLENCE HOTLINE  [phone]    Services provided: free and confidential hotline for victims and survivors of domestic violence. All calls will be routed to a domestic violence service provided in the victim or survivor's area    NATIONAL HUMAN TRAFFICKING HOTLINE  (970) 930-1930    Services provided: national anti-trafficking hotline serving victims and survivors of human trafficking. Provides information about  local resources, and access to safe space to report tips, seek services, and ask for help    VIA LINK  211 or (659) [phone]    Service provided: counselors can provide crisis counseling. Counselors can also provide information and referrals to programs which can help with needs such as food, shelter, medical care, financial assistance, mental health services, substance abuse treatment, senior services, childcare, and more      HOMELESS SHELTERS:      Homeless shelters  [facility]  Emergency shelter for individuals and families  [address]  [phone]  [facility]  Emergency shelter for men only  Meals daily 6am, 2pm, & 6pm  Clothing, case management M-F by appointment (ID/job/housing/legal assistance), mail  [address] [facility]  [phone]  [facility]  Emergency shelter for men  [address] [facility]  [phone]  Emergency shelter for women  [address] [facility]  [phone]  Breakfast & lunch daily, dinner M-F  Case management, job counseling services   [facility]  Emergency shelter for teens and young adults up to 22yo  [address] [facility]  [phone]  [facility]  Emergency shelter for women over 19yo and their kids  [address] [place] [address]  [phone]  [facility]  Day program, meals M-F 1PM (arrive early)  Showers, laundry, hygiene kits, showers, phones, , notary services, case management, ID assistance  [address] [facility]  [phone] M-F 8am-2:30pm  [facility]  Day program  MTWF 7:30am-3:30pm,  8:30am-3:30pm  Crisis intervention, employment assistance, food/clothing, hygiene kits, bus tokens, mail  [address] [facility] [address]  [phone]  [facility]  Mobile outreach for homeless persons in [facility]  [phone]  [facility]  Primary healthcare, case management, dental services, TB placement  Call ahead  [address] [facility] [address]  [phone]  [facility]  Connects  homeless people with their loved ones in other [facility] by providing transportation costs   [phone]      [place] RESOURCES:     [place] [facility] Crisis Response Team:    Region 12 ([place], [place], [place], and [facility][1]) ([facility])  [phone]      Outpatient Mental Health & Addiction Clinic Resources for both [facility] and [facility]:    [facility] Mental Healthcare Resources  Website: www.[facility].org  Main Number: 901-067-8879    [facility] ([facility])  [address] ([address] [facility]) [place] [address]  [phone]    [facility] ([facility])  [address] ([address] [facility]) [place] [address]  [phone]    [facility]  [address] ([address]) [place] [address]  [phone]    [facility]  [address] ([address] [facility]) [place] [address]  [phone]      Addiction Treatment Resources for both [facility] and [facility]:    [facility] (Detox, Residential, PHP, IOP, and Aftercare Programs)  [address] [place] [address]  [phone]    [facility] (Residential, IOP, Transitional Living, and Aftercare Programs)  [address] [place] [address]  [phone]    [facility] (Inpatient, Residential, Detox, IOP, Outpatient, and Aftercare Programs)  [address] [place] [address]  [phone] or toll free at [phone]      Outpatient Mental Health Psychotherapy Resources for both [facility] and [facility]:    Hiwot Erickson, LCSW  [address]  [place] [address] [phone]  Specialties: Depression, Anxiety, and Life Transitions    Dayanara Oneill, PhD  [address]  [place] [address] [phone]  Specialties: Testing and  Evaluation, Education and Learning Disabilities, and ADHD    Annette Tripathi, [facility] [address] [place] [address]  [phone]  Specialties: Obsessive-Compulsive (OCD), Depression, and Relationship Issues    Edilia Silva LPC [address] [place] [address]  [phone]  Specialties: Trauma & PTSD, Mood Disorders, and Anxiety    Edilia Lutz, PhD, [facility]  [facility] [address] [place] [address]  [phone]  Specialties: Family Conflict, Child, and Relationship Issues    Kendra Dowling LPC [facility] [place] [address] [phone]  Specialties: Anxiety, Depression, and Anger Management        IN CASE OF SUICIDAL THINKING, call the National Suicide Hotline Number: 988    988 Suicide & Crisis Lifeline: 988 , 2-613-299-TALK (8255)  Provides 24/7, free and confidential support for people in distress, prevention and crisis resources for you or your loved ones, and best practices for professionals.    Call, text or chat.  https://988OneStopWebline.org

## 2025-06-01 NOTE — PROCEDURES
Attempted to place patient on bipap (RT).  Patient does not tolerate and refuses.  MD aware.  Attempting humidified high flow nasal cannula.     DATE: 10/31/23    EEG NUMBER:  FH     REFERRING PHYSICIAN:  Dr. Gallego      This EEG was performed to assess for evidence of underlying epilepsy.     ELECTROENCEPHALOGRAM REPORT     METHODOLOGY:  Electroencephalographic (EEG) recording is with electrodes placed according to the International 10-20 placement system.  Thirty two (32) channels of digital signal are simultaneously recorded from the scalp and may include EKG, EMG, and/or eye monitors.   Recording band pass was 0.1 to 512 hz.  Digital video recording of the patient is simultaneously recorded with the EEG.  The staff report clinical symptoms and may press an event button when the patient has symptoms of clinical interest to the treating physicians.  EEG and video recording is stored and archived in digital format.  The entire recording is visually reviewed, and the times identified by computer analysis as being spikes or seizures are reviewed again.  Activation procedures which include photic stimulation, hyperventilation and instructing patients to perform simple task are done in selected patients.   Compresses spectral analysis (CSA) is also performed on the activity recorded from each individual channel.  This is displayed as a power display of frequencies from 0 to 30 Hz over time.   The CSA analysis is done and displayed continuously.  This is reviewed for asymmetries in power between homologous areas of the scalp and for presence of changes in power which can be seen when seizures occur.  Sections of suspected abnormalities on the CSA is then compared with the original EEG recording.                Voovio aka 3Ditize software was also utilized in the review of this study.  This software suite analyzes the EEG recording in multiple domains.  Coherence and rhythmicity is computed to identify EEG sections which may contain organized seizures.  Each channel undergoes analysis to detect presence of spike and sharp waves which have special and morphological  characteristic of epileptic activity.  The routine EEG recording is converted from spacial into frequency domain.  This is then displayed comparing homologous areas to identify areas of significant asymmetry.  Algorithm to identify non-cortically generated artifact is used to separate eye movement, EMG and other artifact from the EEG.     EEG FINDINGS:  The recording was obtained with a number of standard bipolar and referential montages during wakefulness and drowsiness.  In the alert state, the posterior background rhythm was a symmetric, well-modulated 9 to 10 Hz alpha rhythm, which reacted symmetrically to eye opening.  Mild disorganization of the background was noted  During drowsiness, the background rhythm waxed and waned and there were periods of slowing. There were no focal abnormalities.  There were no interictal epileptiform abnormalities and no clinical or electrographic seizures were recorded.  During this study numerous stereotypical events characterized by upper body irregular jerking were noted.  These occurred at hour 11 minute 5, hour 11 minute 6, hour 11 minute 9, hour 11 minute 13, our 11 minute 58, hour 12 minute 2, hour 12 minute 5    The EKG channel revealed a sinus rhythm.     IMPRESSION:  This is an abnormal EEG during wakefulness and drowsiness.  Mild disorganization of the background noted     CLINICAL CORRELATION:  Lou Cunha is a 38 y.o. male with a PMHx of heroin addiction/opioid use disorder who presents to the ED from [place] for seizure like activity.  The patient is currently not maintained on any antiseizure medications.  This is an abnormal EEG during wakefulness and drowsiness.  The overall degree of disorganization for given age is suggestive mild encephalopathy nonspecific to the cause.  There is no evidence of an epileptic process on this recording.  No seizures recorded during this study.  Numerous nonepileptic events were recorded during this study.

## 2025-07-07 NOTE — NURSING
5:40am, patient had witnessed seizure-like activity lasted 30 seconds followed by 2 more episodes with 5 minutes interval. Patient was confused afterwards, looks like on post ictal state. Vital signs stable.  Medical team and Rapid team informed.  Patient kept monitored closely, he had multiple episodes of same activities (11x) (some of it lasted for 5 seconds with maximum not exceeding 30 seconds), vital signs remained stable at room air. No injuries noted. Pt able to ambulate afterwards.  Dr. Gallego came and assessed the patient.  7:30am, patient handed over to day shift, sitting in bed.   
No seizures or seizure-like activities noted as of this time, patient resting comfortably in bed.  
Nurses Note -- 4 Eyes      10/31/2023   5:32 AM      Skin assessed during: Admit      [x] No Altered Skin Integrity Present    []Prevention Measures Documented      [] Yes- Altered Skin Integrity Present or Discovered   [] LDA Added if Not in Epic (Describe Wound)   [] New Altered Skin Integrity was Present on Admit and Documented in LDA   [] Wound Image Taken    Wound Care Consulted? No    Attending Nurse:  Robert Jean RN    Second RN/Staff Member:   Skye Haque RN         
Pt given discharge instructions, friend present and will give him a ride home. Pt verbalized understanding of instructions, iv's removed.   
Pt standing in doorway refusing to step back into room. Security called to pass by pts room.  
Security called, pt non redirectable and in the hallway refusing to renter his room. Dr. Gallego notified, and PRN ativan ordered and given. Pt is currently lying in bed.  
Patient requests all Lab, Cardiology, and Radiology Results on their Discharge Instructions